# Patient Record
(demographics unavailable — no encounter records)

---

## 2024-10-08 NOTE — HISTORY OF PRESENT ILLNESS
[0 - No Distress] : Distress Level: 0 [80: Normal activity with effort; some signs or symptoms of disease.] : 80: Normal activity with effort; some signs or symptoms of disease.  [ECOG Performance Status: 2 - Ambulatory and capable of all self care but unable to carry out any work activities] : Performance Status: 2 - Ambulatory and capable of all self care but unable to carry out any work activities. Up and about more than 50% of waking hours [Disease:__________________________] : Disease: [unfilled] [FreeTextEntry1] : aspirin 81 [de-identified] : 10/08/2024: second visit [Date: ____________] : Patient's last distress assessment performed on [unfilled]. [de-identified] : Johnny Bland is 62-year-old male who is seen in follow up for anemia and deep venous thrombosis. Past medical history is remarkable for hypertension, diabetes, and hyperlipidemia,  history of three CVA first in August 2023; most recent May 2024 ([facility]) ; major neurological defect is balance; he had a cerebellum CVA.  At [facility] and increased bleeding stool hemoccult positive.  given transfusion on 09 June 2024 and he was transferred to [facility]  Seen in hospital 12 June 2024: history of bleeding from gastric ulcer (possible) anemia, diabetes hypertension, Anemia HGB 7.3 g/dL:

## 2024-10-08 NOTE — REASON FOR VISIT
[Follow-Up Visit] : a follow-up [Initial Consultation] : an initial consultation for [Family Member] : family member [Spouse] : spouse [Other: _____] : [unfilled]

## 2024-10-08 NOTE — PHYSICAL EXAM
[Capable of only limited self care, confined to bed or chair more than 50% of waking hours] : Status 3- Capable of only limited self care, confined to bed or chair more than 50% of waking hours [Normal] : affect appropriate [Ulcers] : no ulcers [Mucositis] : no mucositis [Thrush] : no thrush [Vesicles] : no vesicles [de-identified] : ataxia; normal F to nose. wide based gait [de-identified] : patient needs assistance with standing from wheel chair.

## 2024-10-08 NOTE — ASSESSMENT
[FreeTextEntry1] : Johnny Bland is a 62-year old male with hypertension and diabetes who has atherosclerosis, and this is the leading risk factor for CVA multiple in 2023 and 2024.  This is our second visit to discuss prior laboratory findings. His last hospitalization was at [facility] in June 2024. No evidence on testing of a hereditary or acquired predisposition to clotting tendency.  Duplex US Imaging of the right and left legs ordered for 27 August 2024 were reviewed with Bita and with Johnny. No active blood clot seen. The apixaban 2.5 mg PO BID may be discontinuing at the end of October.  Patient will remain on antihypertension medication renewal of the amlodipine, atorvastatin, and aspirin 81 mg PO daily.  iron and vitamin C until results of CBC are returned and will likely discontinue. He has a recurrent lipoma; for now, this lesion may be observed and followed up by Dr Camacho if resection is necessary.  RTC in 6 months to see Justa MENDOZA [Supportive] : Goals of care discussed with patient: Supportive [Palliative Care Plan] : not applicable at this time

## 2024-10-08 NOTE — RESULTS/DATA
[FreeTextEntry1] : review of data in EMHR review of HGB levels. negative anticardiolipin antibody, negative beta 2 screen, negative Silica clotting time, negative dilute Sb viper venom. 09/12/2024  bilateral lower extremities: no evidence of deep venous thrombosis in either lower extremity

## 2024-10-08 NOTE — REVIEW OF SYSTEMS
[Diarrhea: Grade 0] : Diarrhea: Grade 0 [Difficulty Walking] : difficulty walking [Negative] : Allergic/Immunologic [Abdominal Pain] : no abdominal pain [Vomiting] : no vomiting [Constipation] : no constipation [FreeTextEntry7] : stomach ulceration [de-identified] : balance difficulty

## 2025-01-16 NOTE — ASSESSMENT
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  ~2002 he had resection of a benign lipoma from this area.  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 Biopsy was being coordinated, but not done due to his multiple medical comorbidities, recurrent stroke, DVT, GI bleed, and protracted recuperation's.   Presently I recommended a repeat MRI. Prescription entered for right thigh MRI to be done at [place], or other facility if patient prefers.  We should speak when those results are available.  Will likely have (again) the recommendation for image guided biopsy to establish histology, to help guide further management.  Reviewed in detail, all questions answered.   1/16/2025: He and I spoke on the phone. January 13, 2025, he had a repeat MRI of the right thigh. 1.  The soft tissue mass of the medial right thigh measures 27.1 x 12 x 9.2 cm. 2.  It is multilobulated, and lipomatous. Located medially, is interspersed between the sartorius muscle fibers. 3.  Is deep to the hamstring musculature. 4.  It is anterior/medial to the adductor musculature. 5.  Is posterior to the vastus medialis. 6.  Intimately involved with the femoral neurovascular bundle. 7.  No discrete areas of nodular masslike enhancement. 8.  Not significantly changed from November 2023 MRI.  I recommended an image guided biopsy to establish histology before planning surgery. He understands and agrees. Request for procedure entered this evening.

## 2025-01-16 NOTE — REVIEW OF SYSTEMS
[Negative] : Heme/Lymph [FreeTextEntry3] : Glaucoma [FreeTextEntry5] : DVT [FreeTextEntry7] : Lisinopril [FreeTextEntry8] : Ulcer [de-identified] : Right thigh mass [de-identified] : History of stroke [de-identified] : Diabetes

## 2025-01-16 NOTE — HISTORY OF PRESENT ILLNESS
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 October 2023 imaging had suggested a lipomatous tumor, and plan needle biopsy was not performed due to multiple medical problems described below. He returns for interval reassessment.  The mass of the medial right thigh is asymptomatic.  Currently, no other specific or constitutional signs or symptoms.   Referred by his wife, who is a colleague: Dr. Lesley LÓPEZ (plastic surgery).  October 2023: He was seen with a ~3-year history of a slowly enlarging asymptomatic mass of the medial right thigh (recurrent).  ~2002: Resection of a medial right thigh lipoma by my partner JR.  Dr. López spoke with me September 2023. I suggested imaging, and the following results were obtained: 10/14/2023 studies from [place]: Right femur x-ray: No bony pathology. Right thigh: 14 x 5.8 x 11.3 cm subcutaneous mass with some focal nodularity posteriorly.  11/6/2023: Right thigh MRI at [facility]: 27 x 8.6 x 12.3 cm soft tissue mass of the right thigh (medial/posterior). Deep to, and parts INVOLVING the sartorius muscle.  Proximally, the posterior border is the adductor muscle group. Distally, it extends into the posterior thigh, what is bordered by the semimembranosus, semitendinosus, and gracilis muscles.  Lateral border is the short head of the biceps femoris muscle.  Multiple thin septations in the mass, some with focal nodularity.  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  I had recommended an image guided needle biopsy to establish histologic diagnosis. This was never done due to his multiple additional medical comorbidities (below).   No prior personal history of malignancy.   No family history of cancer.   PMD: Dr. Humberto ANN.  + ALLERGIC: Lisinopril.  + Anticoagulation: Daily aspirin.  Previously, when he had his DVT, he was treated with Lovenox, then Eliquis, and subsequently Brilinta, all of which have been discontinued.  No pacemaker or defibrillator. + LOOP-RECORDER.  August 2023: Presented with dizziness, nausea, and vomiting. Brain MRI: Left occipital and bilateral cerebellar acute and subacute infarcts. + Associated bilateral vertebral artery stenoses.  February 2024, he had a recurrent stroke with bilateral cerebellar infarcts. May-June 2024 he was hospitalized at [facility]. Neurology: Dr. Sivan BEASLEY, and Dr Marge HUTCHINSON.  June 2024. Developed a DVT. Treated with Lovenox. Became anemic, with hemoglobin dropping from 13 => 6.0.  Had EGD performed which demonstrated a gastric ulcer, that was treated endoscopically. GI: Dr. Dong CHAPMAN.  Capsule endoscopy: Rare/small nonbleeding angioectasias in the small bowel of unclear significance.  Anticoagulation changed from Lovenox to Eliquis, both have been since discontinued, along with the Brilinta.  Was transferred to [facility].  June 2024, he had a telehealth follow-up visit with Dr. Matt BURTON.  Hematology evaluation (Dr. Merritt COVARRUBIAS) identified no evidence of a hypercoagulable state.   His only current anticoagulant is daily aspirin.  + DIABETES. Treated with metformin. Endocrinology: Dr. Loren BILLINGS.  + Hypercholesterolemia. Takes atorvastatin.  + Hypertension. Treated with amlodipine and hydrochlorothiazide.  Cardiology: Dr. Will HECTOR.  Cardiovascular medicine: Dr. Ranjit HICKS.  + History of gastric ulcer (above). Takes daily pantoprazole. Also takes daily oral iron supplements.  November 2024: Ophthalmology visit: Dr. Sary RANDOLPH. Diagnosed with bilateral glaucoma, and nuclear sclerosis. These changes were felt to be mild, with no intervention recommended. 6-month follow-up scheduled for spring 2025. Also sees Dr. Clayton (ophthalmology).   Summer 2024: Colonoscopy at [facility] at the time of his GI bleed did not show anything worrisome.

## 2025-01-16 NOTE — REVIEW OF SYSTEMS
[Negative] : Heme/Lymph [FreeTextEntry3] : Glaucoma [FreeTextEntry5] : DVT [FreeTextEntry7] : Lisinopril [FreeTextEntry8] : Ulcer [de-identified] : Right thigh mass [de-identified] : History of stroke [de-identified] : Diabetes

## 2025-01-16 NOTE — REVIEW OF SYSTEMS
[Negative] : Heme/Lymph [FreeTextEntry3] : Glaucoma [FreeTextEntry5] : DVT [FreeTextEntry7] : Lisinopril [FreeTextEntry8] : Ulcer [de-identified] : Right thigh mass [de-identified] : History of stroke [de-identified] : Diabetes

## 2025-01-16 NOTE — PHYSICAL EXAM
[Normal] : supple, no neck mass and thyroid not enlarged [Normal] : no peripheral adenopathy appreciated [Normal Neck Lymph Nodes] : normal neck lymph nodes  [Normal Supraclavicular Lymph Nodes] : normal supraclavicular lymph nodes [Normal Groin Lymph Nodes] : normal groin lymph nodes [Normal Axillary Lymph Nodes] : normal axillary lymph nodes [de-identified] : See diagram [de-identified] : See diagram

## 2025-01-16 NOTE — PHYSICAL EXAM
[Normal] : supple, no neck mass and thyroid not enlarged [Normal] : no peripheral adenopathy appreciated [Normal Neck Lymph Nodes] : normal neck lymph nodes  [Normal Supraclavicular Lymph Nodes] : normal supraclavicular lymph nodes [Normal Axillary Lymph Nodes] : normal axillary lymph nodes [Normal Groin Lymph Nodes] : normal groin lymph nodes [de-identified] : See diagram [de-identified] : See diagram

## 2025-01-16 NOTE — HISTORY OF PRESENT ILLNESS
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 October 2023 imaging had suggested a lipomatous tumor, and plan needle biopsy was not performed due to multiple medical problems described below. He returns for interval reassessment.  The mass of the medial right thigh is asymptomatic.  Currently, no other specific or constitutional signs or symptoms.   Referred by his wife, who is a colleague: Dr. Lesley LÓPEZ (plastic surgery).  October 2023: He was seen with a ~3-year history of a slowly enlarging asymptomatic mass of the medial right thigh (recurrent).  ~2002: Resection of a medial right thigh lipoma by my partner JR.  Dr. López spoke with me September 2023. I suggested imaging, and the following results were obtained: 10/14/2023 studies from [place]: Right femur x-ray: No bony pathology. Right thigh: 14 x 5.8 x 11.3 cm subcutaneous mass with some focal nodularity posteriorly.  11/6/2023: Right thigh MRI at [place]: 27 x 8.6 x 12.3 cm soft tissue mass of the right thigh (medial/posterior). Deep to, and parts INVOLVING the sartorius muscle.  Proximally, the posterior border is the adductor muscle group. Distally, it extends into the posterior thigh, what is bordered by the semimembranosus, semitendinosus, and gracilis muscles.  Lateral border is the short head of the biceps femoris muscle.  Multiple thin septations in the mass, some with focal nodularity.  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  I had recommended an image guided needle biopsy to establish histologic diagnosis. This was never done due to his multiple additional medical comorbidities (below).   No prior personal history of malignancy.   No family history of cancer.   PMD: Dr. Humberto ANN.  + ALLERGIC: Lisinopril.  + Anticoagulation: Daily aspirin.  Previously, when he had his DVT, he was treated with Lovenox, then Eliquis, and subsequently Brilinta, all of which have been discontinued.  No pacemaker or defibrillator. + LOOP-RECORDER.  August 2023: Presented with dizziness, nausea, and vomiting. Brain MRI: Left occipital and bilateral cerebellar acute and subacute infarcts. + Associated bilateral vertebral artery stenoses.  February 2024, he had a recurrent stroke with bilateral cerebellar infarcts. May-June 2024 he was hospitalized at [facility]. Neurology: Dr. Sivan BEASLEY, and Dr Marge HUTCHINSON.  June 2024. Developed a DVT. Treated with Lovenox. Became anemic, with hemoglobin dropping from 13 => 6.0.  Had EGD performed which demonstrated a gastric ulcer, that was treated endoscopically. GI: Dr. Dong CHAPMAN.  Capsule endoscopy: Rare/small nonbleeding angioectasias in the small bowel of unclear significance.  Anticoagulation changed from Lovenox to Eliquis, both have been since discontinued, along with the Brilinta.  Was transferred to [facility].  June 2024, he had a telehealth follow-up visit with Dr. Matt BURTON.  Hematology evaluation (Dr. Merritt COVARRUBIAS) identified no evidence of a hypercoagulable state.   His only current anticoagulant is daily aspirin.  + DIABETES. Treated with metformin. Endocrinology: Dr. Loren BILLINGS.  + Hypercholesterolemia. Takes atorvastatin.  + Hypertension. Treated with amlodipine and hydrochlorothiazide.  Cardiology: Dr. Will HECTOR.  Cardiovascular medicine: Dr. Ranjit HICKS.  + History of gastric ulcer (above). Takes daily pantoprazole. Also takes daily oral iron supplements.  November 2024: Ophthalmology visit: Dr. Sary RANDOLPH. Diagnosed with bilateral glaucoma, and nuclear sclerosis. These changes were felt to be mild, with no intervention recommended. 6-month follow-up scheduled for spring 2025. Also sees Dr. Clayton (ophthalmology).   Summer 2024: Colonoscopy at [facility] at the time of his GI bleed did not show anything worrisome.

## 2025-01-16 NOTE — HISTORY OF PRESENT ILLNESS
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 October 2023 imaging had suggested a lipomatous tumor, and plan needle biopsy was not performed due to multiple medical problems described below. He returns for interval reassessment.  The mass of the medial right thigh is asymptomatic.  Currently, no other specific or constitutional signs or symptoms.   Referred by his wife, who is a colleague: Dr. Lesley LÓPEZ (plastic surgery).  October 2023: He was seen with a ~3-year history of a slowly enlarging asymptomatic mass of the medial right thigh (recurrent).  ~2002: Resection of a medial right thigh lipoma by my partner JR.  Dr. López spoke with me September 2023. I suggested imaging, and the following results were obtained: 10/14/2023 studies from [place]: Right femur x-ray: No bony pathology. Right thigh: 14 x 5.8 x 11.3 cm subcutaneous mass with some focal nodularity posteriorly.  11/6/2023: Right thigh MRI at [place]: 27 x 8.6 x 12.3 cm soft tissue mass of the right thigh (medial/posterior). Deep to, and parts INVOLVING the sartorius muscle.  Proximally, the posterior border is the adductor muscle group. Distally, it extends into the posterior thigh, what is bordered by the semimembranosus, semitendinosus, and gracilis muscles.  Lateral border is the short head of the biceps femoris muscle.  Multiple thin septations in the mass, some with focal nodularity.  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  I had recommended an image guided needle biopsy to establish histologic diagnosis. This was never done due to his multiple additional medical comorbidities (below).   No prior personal history of malignancy.   No family history of cancer.   PMD: Dr. Humberto ANN.  + ALLERGIC: Lisinopril.  + Anticoagulation: Daily aspirin.  Previously, when he had his DVT, he was treated with Lovenox, then Eliquis, and subsequently Brilinta, all of which have been discontinued.  No pacemaker or defibrillator. + LOOP-RECORDER.  August 2023: Presented with dizziness, nausea, and vomiting. Brain MRI: Left occipital and bilateral cerebellar acute and subacute infarcts. + Associated bilateral vertebral artery stenoses.  February 2024, he had a recurrent stroke with bilateral cerebellar infarcts. May-June 2024 he was hospitalized at [facility]. Neurology: Dr. Sivan BEASLEY, and Dr Marge HUTCHINSON.  June 2024. Developed a DVT. Treated with Lovenox. Became anemic, with hemoglobin dropping from 13 => 6.0.  Had EGD performed which demonstrated a gastric ulcer, that was treated endoscopically. GI: Dr. Dong CHAPMAN.  Capsule endoscopy: Rare/small nonbleeding angioectasias in the small bowel of unclear significance.  Anticoagulation changed from Lovenox to Eliquis, both have been since discontinued, along with the Brilinta.  Was transferred to [facility].  June 2024, he had a telehealth follow-up visit with Dr. Matt BURTON.  Hematology evaluation (Dr. Merritt COVARRUBIAS) identified no evidence of a hypercoagulable state.   His only current anticoagulant is daily aspirin.  + DIABETES. Treated with metformin. Endocrinology: Dr. Loren BILLINGS.  + Hypercholesterolemia. Takes atorvastatin.  + Hypertension. Treated with amlodipine and hydrochlorothiazide.  Cardiology: Dr. Will HECTOR.  Cardiovascular medicine: Dr. Ranjit HICKS.  + History of gastric ulcer (above). Takes daily pantoprazole. Also takes daily oral iron supplements.  November 2024: Ophthalmology visit: Dr. Sary RANDOLPH. Diagnosed with bilateral glaucoma, and nuclear sclerosis. These changes were felt to be mild, with no intervention recommended. 6-month follow-up scheduled for spring 2025. Also sees Dr. Clayton (ophthalmology).   Summer 2024: Colonoscopy at [facility] at the time of his GI bleed did not show anything worrisome.

## 2025-01-16 NOTE — ASSESSMENT
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  ~2002 he had resection of a benign lipoma from this area.  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 Biopsy was being coordinated, but not done due to his multiple medical comorbidities, recurrent stroke, DVT, GI bleed, and protracted recuperation's.   Presently I recommended a repeat MRI. Prescription entered for right thigh MRI to be done at [place], or other facility if patient prefers.  We should speak when those results are available.  Will likely have (again) the recommendation for image guided biopsy to establish histology, to help guide further management.  Reviewed in detail, all questions answered.   1/16/2025: He and I spoke on the phone. January 13, 2025, he had a repeat MRI of the right thigh. 1.  The soft tissue mass of the medial right thigh measures 27.1 x 12 x 9.2 cm. 2.  It is multilobulated, and lipomatous. Located medially, is interspersed between the sartorius muscle fibers. 3.  Is deep to the hamstring musculature. 4.  It is anterior/medial to the adductor musculature. 5.  Is posterior to the vastus medialis. 6.  Intimately involved with the femoral neurovascular bundle. 7.  No discrete areas of nodular masslike enhancement. 8.  Not significantly changed from November 2023 MRI.  I recommended an image guided biopsy to establish histology before planning surgery. He understands and agrees. Request for procedure entered this evening.

## 2025-01-16 NOTE — ASSESSMENT
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  ~2002 he had resection of a benign lipoma from this area.  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 Biopsy was being coordinated, but not done due to his multiple medical comorbidities, recurrent stroke, DVT, GI bleed, and protracted recuperation's.   Presently I recommended a repeat MRI. Prescription entered for right thigh MRI to be done at [place], or other facility if patient prefers.  We should speak when those results are available.  Will likely have (again) the recommendation for image guided biopsy to establish histology, to help guide further management.  Reviewed in detail, all questions answered.   1/16/2025: He and I spoke on the phone. January 13, 2025, he had a repeat MRI of the right thigh. 1.  The soft tissue mass of the medial right thigh measures 27.1 x 12 x 9.2 cm. 2.  It is multilobulated, and lipomatous. Located medially, is interspersed between the sartorius muscle fibers. 3.  Is deep to the hamstring musculature. 4.  It is anterior/medial to the adductor musculature. 5.  Is posterior to the vastus medialis. 6.  Intimately involved with the femoral neurovascular bundle. 7.  No discrete areas of nodular masslike enhancement. 8.  Not significantly changed from November 2023 MRI.  I recommended an image guided biopsy to establish histology before planning surgery. He understands and agrees. Request for procedure entered this evening.

## 2025-01-16 NOTE — PHYSICAL EXAM
[Normal] : supple, no neck mass and thyroid not enlarged [Normal] : no peripheral adenopathy appreciated [Normal Neck Lymph Nodes] : normal neck lymph nodes  [Normal Supraclavicular Lymph Nodes] : normal supraclavicular lymph nodes [Normal Groin Lymph Nodes] : normal groin lymph nodes [Normal Axillary Lymph Nodes] : normal axillary lymph nodes [de-identified] : See diagram [de-identified] : See diagram

## 2025-01-16 NOTE — PHYSICAL EXAM
[Normal] : supple, no neck mass and thyroid not enlarged [Normal] : no peripheral adenopathy appreciated [Normal Neck Lymph Nodes] : normal neck lymph nodes  [Normal Supraclavicular Lymph Nodes] : normal supraclavicular lymph nodes [Normal Axillary Lymph Nodes] : normal axillary lymph nodes [Normal Groin Lymph Nodes] : normal groin lymph nodes [de-identified] : See diagram [de-identified] : See diagram

## 2025-01-16 NOTE — REVIEW OF SYSTEMS
[Negative] : Heme/Lymph [FreeTextEntry3] : Glaucoma [FreeTextEntry5] : DVT [FreeTextEntry7] : Lisinopril [FreeTextEntry8] : Ulcer [de-identified] : Right thigh mass [de-identified] : History of stroke [de-identified] : Diabetes

## 2025-01-16 NOTE — ASSESSMENT
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  ~2002 he had resection of a benign lipoma from this area.  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 Biopsy was being coordinated, but not done due to his multiple medical comorbidities, recurrent stroke, DVT, GI bleed, and protracted recuperation's.   Presently I recommended a repeat MRI. Prescription entered for right thigh MRI to be done at [place], or other facility if patient prefers.  We should speak when those results are available.  Will likely have (again) the recommendation for image guided biopsy to establish histology, to help guide further management.  Reviewed in detail, all questions answered.   1/16/2025: He and I spoke on the phone. January 13, 2025, he had a repeat MRI of the right thigh. 1.  The soft tissue mass of the medial right thigh measures 27.1 x 12 x 9.2 cm. 2.  It is multilobulated, and lipomatous. Located medially, is interspersed between the sartorius muscle fibers. 3.  Is deep to the hamstring musculature. 4.  It is anterior/medial to the adductor musculature. 5.  Is posterior to the vastus medialis. 6.  Intimately involved with the femoral neurovascular bundle. 7.  No discrete areas of nodular masslike enhancement. 8.  Not significantly changed from November 2023 MRI.  I recommended an image guided biopsy to establish histology before planning surgery. He understands and agrees. Request for procedure entered this evening.

## 2025-01-16 NOTE — REASON FOR VISIT
[Follow-Up Visit] : a follow-up visit for [Other: _____] : [unfilled] [FreeTextEntry2] : Recurrent, soft tissue mass of the medial right thigh.

## 2025-01-16 NOTE — HISTORY OF PRESENT ILLNESS
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 October 2023 imaging had suggested a lipomatous tumor, and plan needle biopsy was not performed due to multiple medical problems described below. He returns for interval reassessment.  The mass of the medial right thigh is asymptomatic.  Currently, no other specific or constitutional signs or symptoms.   Referred by his wife, who is a colleague: Dr. Lesley LÓPEZ (plastic surgery).  October 2023: He was seen with a ~3-year history of a slowly enlarging asymptomatic mass of the medial right thigh (recurrent).  ~2002: Resection of a medial right thigh lipoma by my partner JR.  Dr. López spoke with me September 2023. I suggested imaging, and the following results were obtained: 10/14/2023 studies from [facility]: Right femur x-ray: No bony pathology. Right thigh: 14 x 5.8 x 11.3 cm subcutaneous mass with some focal nodularity posteriorly.  11/6/2023: Right thigh MRI at [facility]: 27 x 8.6 x 12.3 cm soft tissue mass of the right thigh (medial/posterior). Deep to, and parts INVOLVING the sartorius muscle.  Proximally, the posterior border is the adductor muscle group. Distally, it extends into the posterior thigh, what is bordered by the semimembranosus, semitendinosus, and gracilis muscles.  Lateral border is the short head of the biceps femoris muscle.  Multiple thin septations in the mass, some with focal nodularity.  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  I had recommended an image guided needle biopsy to establish histologic diagnosis. This was never done due to his multiple additional medical comorbidities (below).   No prior personal history of malignancy.   No family history of cancer.   PMD: Dr. Humberto ANN.  + ALLERGIC: Lisinopril.  + Anticoagulation: Daily aspirin.  Previously, when he had his DVT, he was treated with Lovenox, then Eliquis, and subsequently Brilinta, all of which have been discontinued.  No pacemaker or defibrillator. + LOOP-RECORDER.  August 2023: Presented with dizziness, nausea, and vomiting. Brain MRI: Left occipital and bilateral cerebellar acute and subacute infarcts. + Associated bilateral vertebral artery stenoses.  February 2024, he had a recurrent stroke with bilateral cerebellar infarcts. May-June 2024 he was hospitalized at [facility]. Neurology: Dr. Sivan BEASLEY, and Dr Marge HUTCHINSON.  June 2024. Developed a DVT. Treated with Lovenox. Became anemic, with hemoglobin dropping from 13 => 6.0.  Had EGD performed which demonstrated a gastric ulcer, that was treated endoscopically. GI: Dr. Dong CHAPMAN.  Capsule endoscopy: Rare/small nonbleeding angioectasias in the small bowel of unclear significance.  Anticoagulation changed from Lovenox to Eliquis, both have been since discontinued, along with the Brilinta.  Was transferred to [facility].  June 2024, he had a telehealth follow-up visit with Dr. Matt BURTON.  Hematology evaluation (Dr. Merritt COVARRUBIAS) identified no evidence of a hypercoagulable state.   His only current anticoagulant is daily aspirin.  + DIABETES. Treated with metformin. Endocrinology: Dr. Loren BILLINGS.  + Hypercholesterolemia. Takes atorvastatin.  + Hypertension. Treated with amlodipine and hydrochlorothiazide.  Cardiology: Dr. Will HECTOR.  Cardiovascular medicine: Dr. Ranjit HICKS.  + History of gastric ulcer (above). Takes daily pantoprazole. Also takes daily oral iron supplements.  November 2024: Ophthalmology visit: Dr. Sary RANDOLPH. Diagnosed with bilateral glaucoma, and nuclear sclerosis. These changes were felt to be mild, with no intervention recommended. 6-month follow-up scheduled for spring 2025. Also sees Dr. Clayton (ophthalmology).   Summer 2024: Colonoscopy at [facility] at the time of his GI bleed did not show anything worrisome.

## 2025-01-30 NOTE — ASSESSMENT
[FreeTextEntry1] : 60 y/o M PMH CVA w/ RLE weakness, HTN, PAD, DM2, GIB 2/2 gastric ulcer s/p EGD clamping 5/2024, RLE DVT, CKD stage 3, recently hospitalized at [facility] in May to June 2024 for CVA, GIB) discharged to [facility], readmitted due to anemia, s/p PRBC transfusion - noted to have melena and positive guaiac. Patient presents to vascular clinic due to a  right posterior tibial vein DVT(now resoved) and a stable right peroneal vein DVT as well as a persistent DVT within the left gastrocnemius vein.  Plan: 1.Venous duplex with chronic below the knee  2. Patient with improved mobility  3.  Eliquis was stopped in October.  4.  Chronic changes R peroneal vein.  I think with his limited mobility and surgery coming up, he should RESUME eliquis 2.5mg BID  5.  I think if he goes for for lipoma resection, given his history of DVT, if his mobility is going to be limited post op, it may be reasonable to STAY ON eliquis 2.5mg BID Would repeat duplex 2 weeks post op  Hold eliquis 3 days prior to Biopsy and resume afterwards last dose Thursday Evening Feb 6th Hold eliquis 3 days prior to lipoma resection and resume afterwards  Adbry Counseling: I discussed with the patient the risks of tralokinumab including but not limited to eye infection and irritation, cold sores, injection site reactions, worsening of asthma, allergic reactions and increased risk of parasitic infection.  Live vaccines should be avoided while taking tralokinumab. The patient understands that monitoring is required and they must alert us or the primary physician if symptoms of infection or other concerning signs are noted.

## 2025-01-30 NOTE — PHYSICAL EXAM
[Normal Appearance] : normal appearance [Respiration, Rhythm And Depth] : normal respiratory rhythm and effort [Heart Sounds] : normal S1 and S2 [Abdomen Soft] : soft [Abdomen Tenderness] : non-tender [Nail Clubbing] : no clubbing of the fingernails [Cyanosis, Localized] : no localized cyanosis [Petechial Hemorrhages (___cm)] : no petechial hemorrhages [] : no ischemic changes [Oriented To Time, Place, And Person] : oriented to person, place, and time [Skin Color & Pigmentation] : normal skin color and pigmentation [FreeTextEntry1] : Has right sided weakness

## 2025-01-30 NOTE — PHYSICAL EXAM
[Normal Appearance] : normal appearance [Respiration, Rhythm And Depth] : normal respiratory rhythm and effort [Heart Sounds] : normal S1 and S2 [Abdomen Soft] : soft [Abdomen Tenderness] : non-tender [Nail Clubbing] : no clubbing of the fingernails [Cyanosis, Localized] : no localized cyanosis [Petechial Hemorrhages (___cm)] : no petechial hemorrhages [] : no ischemic changes [Skin Color & Pigmentation] : normal skin color and pigmentation [Oriented To Time, Place, And Person] : oriented to person, place, and time [FreeTextEntry1] : Has right sided weakness

## 2025-01-30 NOTE — REASON FOR VISIT
[Follow-Up - Clinic] : a clinic follow-up of [FreeTextEntry2] : VTE [FreeTextEntry1] : 1/30/2025  Seen by Dr. Jimenez Oct 2024 Eliquis 2.5m stopped End of Oct Duplex earlier this month with CHRONIC changes in the R BTK Vein  needs biopsy of R  leg lipoma May need surgery for the leg.   LE venous duplex 9/12: No DVT  9/13/2024  Doing well now on Iron BID on eliquis 5mg BID remains on aspirin 81mg daily   LE venous duplex 9/12/2024: No evidence of deep venous thrombosis in either lower extremity.  7/24/2024  Recent hospitalization 6/9 to 6/21/2024  62 y/o M PMH CVA w/ RLE weakness, HTN, PAD, DM2, GIB 2/2 gastric ulcer s/p EGD clamping 5/2024, RLE DVT, CKD stage 3, recently hospitalized at [facility] in May to June 2024 for CVA, GIB) discharged to [facility], readmitted due to anemia, s/p PRBC transfusion - noted to have melena and positive guaiac.  LE venous duplex 7/2/2024: Interval resolution of previously demonstrated right posterior tibial vein DVT. There is stable right peroneal vein DVT. Persistent DVT within the left gastrocnemius vein.  s/p capsule endoscopy; some rare/small/non bleeding angioectasias in the small bowel. Unclear significance. IR was consulted and IVC filter was not recommended.  Patient was discharged on Eliquis 5 mg BID.  Stroke work-up: Negative bubble study Loop recorder implanted, no events so far Hypercoagulable workup was sent: negative so far  Patient does not have a family history of hypercoagulable state, DVT/PE.      Discharge Medications (updated in allscripts): 	acetaminophen 325 mg oral tablet: 2 tab(s) orally every 6 hours as needed amLODIPine 5 mg oral tablet: 1 tab(s) orally once a day apixaban 5 mg oral tablet: 1 tab(s) orally every 12 hours aspirin 81 mg oral delayed release tablet: 1 tab(s) orally once a day atorvastatin 40 mg oral tablet: 1 tab(s) orally once a day (at bedtime) bisacodyl 10 mg rectal suppository: 1 suppository(ies) rectal once a day As needed Constipation hydroCHLOROthiazide 12.5 mg oral capsule: 1 cap(s) orally once a day insulin lispro 100 units/mL subcutaneous solution: subcutaneous 3 times a day (before meals) as per sliding scale. 151-200= 1u, 201-250= 2u, 241-300= 3u, 301-400= 4u MetFORMIN (Eqv-Glucophage XR) 500 mg oral tablet, extended release: 1 tab(s) orally once a day (in the evening) pantoprazole 40 mg oral delayed release tablet: 1 tab(s) orally 2 times a day polyethylene glycol 3350 oral powder for reconstitution: 17 gram(s) orally once a day senna leaf extract oral tablet: 2 tab(s) orally once a day (at bedtime) Vitamin A and D topical ointment: Apply topically to affected area 3 times a day ,

## 2025-01-30 NOTE — REASON FOR VISIT
[Follow-Up - Clinic] : a clinic follow-up of [FreeTextEntry2] : VTE [FreeTextEntry1] : 1/30/2025  Seen by Dr. Jimenez Oct 2024 Eliquis 2.5m stopped End of Oct Duplex earlier this month with CHRONIC changes in the R BTK Vein  needs biopsy of R  leg lipoma May need surgery for the leg.   LE venous duplex 9/12: No DVT  9/13/2024  Doing well now on Iron BID on eliquis 5mg BID remains on aspirin 81mg daily   LE venous duplex 9/12/2024: No evidence of deep venous thrombosis in either lower extremity.  7/24/2024  Recent hospitalization 6/9 to 6/21/2024  60 y/o M PMH CVA w/ RLE weakness, HTN, PAD, DM2, GIB 2/2 gastric ulcer s/p EGD clamping 5/2024, RLE DVT, CKD stage 3, recently hospitalized at [facility] in May to June 2024 for CVA, GIB) discharged to [facility], readmitted due to anemia, s/p PRBC transfusion - noted to have melena and positive guaiac.  LE venous duplex 7/2/2024: Interval resolution of previously demonstrated right posterior tibial vein DVT. There is stable right peroneal vein DVT. Persistent DVT within the left gastrocnemius vein.  s/p capsule endoscopy; some rare/small/non bleeding angioectasias in the small bowel. Unclear significance. IR was consulted and IVC filter was not recommended.  Patient was discharged on Eliquis 5 mg BID.  Stroke work-up: Negative bubble study Loop recorder implanted, no events so far Hypercoagulable workup was sent: negative so far  Patient does not have a family history of hypercoagulable state, DVT/PE.      Discharge Medications (updated in allscripts): 	acetaminophen 325 mg oral tablet: 2 tab(s) orally every 6 hours as needed amLODIPine 5 mg oral tablet: 1 tab(s) orally once a day apixaban 5 mg oral tablet: 1 tab(s) orally every 12 hours aspirin 81 mg oral delayed release tablet: 1 tab(s) orally once a day atorvastatin 40 mg oral tablet: 1 tab(s) orally once a day (at bedtime) bisacodyl 10 mg rectal suppository: 1 suppository(ies) rectal once a day As needed Constipation hydroCHLOROthiazide 12.5 mg oral capsule: 1 cap(s) orally once a day insulin lispro 100 units/mL subcutaneous solution: subcutaneous 3 times a day (before meals) as per sliding scale. 151-200= 1u, 201-250= 2u, 241-300= 3u, 301-400= 4u MetFORMIN (Eqv-Glucophage XR) 500 mg oral tablet, extended release: 1 tab(s) orally once a day (in the evening) pantoprazole 40 mg oral delayed release tablet: 1 tab(s) orally 2 times a day polyethylene glycol 3350 oral powder for reconstitution: 17 gram(s) orally once a day senna leaf extract oral tablet: 2 tab(s) orally once a day (at bedtime) Vitamin A and D topical ointment: Apply topically to affected area 3 times a day ,

## 2025-01-30 NOTE — REASON FOR VISIT
[Follow-Up - Clinic] : a clinic follow-up of [FreeTextEntry2] : VTE [FreeTextEntry1] : 1/30/2025  Seen by Dr. Jimenez Oct 2024 Eliquis 2.5m stopped End of Oct Duplex earlier this month with CHRONIC changes in the R BTK Vein  needs biopsy of R  leg lipoma May need surgery for the leg.   LE venous duplex 9/12: No DVT  9/13/2024  Doing well now on Iron BID on eliquis 5mg BID remains on aspirin 81mg daily   LE venous duplex 9/12/2024: No evidence of deep venous thrombosis in either lower extremity.  7/24/2024  Recent hospitalization 6/9 to 6/21/2024  60 y/o M PMH CVA w/ RLE weakness, HTN, PAD, DM2, GIB 2/2 gastric ulcer s/p EGD clamping 5/2024, RLE DVT, CKD stage 3, recently hospitalized at [facility] in May to June 2024 for CVA, GIB) discharged to [facility], readmitted due to anemia, s/p PRBC transfusion - noted to have melena and positive guaiac.  LE venous duplex 7/2/2024: Interval resolution of previously demonstrated right posterior tibial vein DVT. There is stable right peroneal vein DVT. Persistent DVT within the left gastrocnemius vein.  s/p capsule endoscopy; some rare/small/non bleeding angioectasias in the small bowel. Unclear significance. IR was consulted and IVC filter was not recommended.  Patient was discharged on Eliquis 5 mg BID.  Stroke work-up: Negative bubble study Loop recorder implanted, no events so far Hypercoagulable workup was sent: negative so far  Patient does not have a family history of hypercoagulable state, DVT/PE.      Discharge Medications (updated in allscripts): 	acetaminophen 325 mg oral tablet: 2 tab(s) orally every 6 hours as needed amLODIPine 5 mg oral tablet: 1 tab(s) orally once a day apixaban 5 mg oral tablet: 1 tab(s) orally every 12 hours aspirin 81 mg oral delayed release tablet: 1 tab(s) orally once a day atorvastatin 40 mg oral tablet: 1 tab(s) orally once a day (at bedtime) bisacodyl 10 mg rectal suppository: 1 suppository(ies) rectal once a day As needed Constipation hydroCHLOROthiazide 12.5 mg oral capsule: 1 cap(s) orally once a day insulin lispro 100 units/mL subcutaneous solution: subcutaneous 3 times a day (before meals) as per sliding scale. 151-200= 1u, 201-250= 2u, 241-300= 3u, 301-400= 4u MetFORMIN (Eqv-Glucophage XR) 500 mg oral tablet, extended release: 1 tab(s) orally once a day (in the evening) pantoprazole 40 mg oral delayed release tablet: 1 tab(s) orally 2 times a day polyethylene glycol 3350 oral powder for reconstitution: 17 gram(s) orally once a day senna leaf extract oral tablet: 2 tab(s) orally once a day (at bedtime) Vitamin A and D topical ointment: Apply topically to affected area 3 times a day ,

## 2025-01-30 NOTE — ASSESSMENT
[FreeTextEntry1] : 62 y/o M PMH CVA w/ RLE weakness, HTN, PAD, DM2, GIB 2/2 gastric ulcer s/p EGD clamping 5/2024, RLE DVT, CKD stage 3, recently hospitalized at [facility] in May to June 2024 for CVA, GIB) discharged to [facility], readmitted due to anemia, s/p PRBC transfusion - noted to have melena and positive guaiac. Patient presents to vascular clinic due to a  right posterior tibial vein DVT(now resoved) and a stable right peroneal vein DVT as well as a persistent DVT within the left gastrocnemius vein.  Plan: 1.Venous duplex with chronic below the knee  2. Patient with improved mobility  3.  Eliquis was stopped in October.  4.  Chronic changes R peroneal vein.  I think with his limited mobility and surgery coming up, he should RESUME eliquis 2.5mg BID  5.  I think if he goes for for lipoma resection, given his history of DVT, if his mobility is going to be limited post op, it may be reasonable to STAY ON eliquis 2.5mg BID Would repeat duplex 2 weeks post op  Hold eliquis 3 days prior to Biopsy and resume afterwards last dose Thursday Evening Feb 6th Hold eliquis 3 days prior to lipoma resection and resume afterwards

## 2025-01-30 NOTE — ASSESSMENT
[FreeTextEntry1] : 60 y/o M PMH CVA w/ RLE weakness, HTN, PAD, DM2, GIB 2/2 gastric ulcer s/p EGD clamping 5/2024, RLE DVT, CKD stage 3, recently hospitalized at [facility] in May to June 2024 for CVA, GIB) discharged to [facility], readmitted due to anemia, s/p PRBC transfusion - noted to have melena and positive guaiac. Patient presents to vascular clinic due to a  right posterior tibial vein DVT(now resoved) and a stable right peroneal vein DVT as well as a persistent DVT within the left gastrocnemius vein.  Plan: 1.Venous duplex with chronic below the knee  2. Patient with improved mobility  3.  Eliquis was stopped in October.  4.  Chronic changes R peroneal vein.  I think with his limited mobility and surgery coming up, he should RESUME eliquis 2.5mg BID  5.  I think if he goes for for lipoma resection, given his history of DVT, if his mobility is going to be limited post op, it may be reasonable to STAY ON eliquis 2.5mg BID Would repeat duplex 2 weeks post op  Hold eliquis 3 days prior to Biopsy and resume afterwards last dose Thursday Evening Feb 6th Hold eliquis 3 days prior to lipoma resection and resume afterwards  [FreeTextEntry1] : On birth control and does not remember when last cycle was.

## 2025-03-24 NOTE — DISCUSSION/SUMMARY
[FreeTextEntry1] : Mr. 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  He was treated at [facility] for dizziness nausea/vomiting-found to have bilateral cerebellar infarctions and left occipital infarction. The ischemic infarcts were thought to be secondary to basilar artery stenosis which was being monitored over MRA head no, progressive worsening was noted from 35-18. Following this cerebral catheter angiogram was done that showed complete occlusion of basilar artery. He has since recovered significantly but continues to have issues with balance and have recommended that he continue physical therapy along with gym exercises to improve balance.  I also recommended that at this point when we have concrete evidence of basilar artery occlusion it is reasonable to continue antiplatelet/antithrombotic therapy. There is no strong indication for dual antiplatelet therapy however knowing that he had progressive basilar stenosis it is reasonable to continue aspirin 81 mg and ticagrelor 90 mg twice daily along with high-dose statins. I also recommended aggressive medical management of risk factors. And a follow-up MRI brain in 6 months to rule out silent brain infarction due to existing large artery atherosclerotic disease.  4/19/24 - Overall he is neurologically stable. - In February 2024, he had a recurrent stroke with b/l cerebellar infarcts, likely due to symptomatic intracranial atherosclerosis (basilar stenosis versus occlusion). At the time of presentation he was on Aspirin and Clopidogrel; his P2Y12 was subtherapeutic, though he did admit to missing a few doses. He was switched to Brilinta 90mg and Aspirin.  6/25/2024- Phone appointment - Hospital admission - Now on Lovenox for DVT - Hb dropped from 13 to 6.0; transfused, now in [facility] No new infarcts - on MRI in May 2024; Hb is stabilized on 8.5; now on apixaban. Full dose DVT below knee - new - no filter necessary  I had a very lengthy discussion with the patient's wife and the patient and decided to continue aspirin 81 mg in addition to full dose apixaban. They will make an appointment to see us after discharge from [facility] rehab following which we will decide whether he needs antiplatelet and anticoagulation continuously. Based on my review of his case severe vertebrobasilar stenosis, otherwise lower risk of recurrent GI bleed it may be reasonable to maintain him on either dual antiplatelet or full dose anticoagulation for long-term.   3/24/2025 Since last visit he reports feeling well. Denies any new or worsening symptoms. He is currently on AC and ASA as per hematology/vascular for DVT. Once AC is discontinued by them, he will remain on ASA along with strict risk factor control. Signs of stroke discussed in detail. All questions and concerns have been addressed. I will call them after Dopplers.  [Antithrombotic therapy with ___] : antithrombotic therapy with  [unfilled] [Intensive Blood Pressure Control] : intensive blood pressure control [Lipid Lowering Therapy] : lipid lowering therapy [Patient encouraged to discuss with Primary MD] : I encouraged the patient to discuss these important issues with ~his/her~ primary care doctor [Goals and Counseling] : I have reviewed the goals of stroke risk factor modification. I counseled the patient on measures to reduce stroke risk, including the importance of medication compliance, risk factor control, exercise, healthy diet and avoidance of smoking. I reviewed stroke warning signs and symptoms and appropriate actions to take if such occur.

## 2025-03-24 NOTE — HISTORY OF PRESENT ILLNESS
[FreeTextEntry1] : CARLOS FARR is a 61 year-old male with a PMHx significant for HTN, DM II (non-compliant with Metformin), and HLD, who presents to Dr. Fuentes office today for evaluation of Basilar artery stenosis.  Still complains of balance issues - later pulsion - either side nonspecific. No falls  History - HPI modified from  visit - Patient initially presented to [facility] ED 8/25/23 complaining of dizziness, nausea/vomiting, and generalized weakness. He reported his symptoms started several days prior that resolved, but returned day of presentation. NIHSS 2. CT Head demonstrated small to moderate subacute Left superior cerebellar infarct and small subacute Left occipital infarct with suspicion for acute Right superior cerebellar infarct as well. CTA Head/Neck was non-diagnostic as there was poor opacification of contrast, but did suggested L superior cerebellar infarct, L occipital infarct, and R cerebellar infarct. Of note, patient's BG in ED was 300s, patient had stopped taking his Metformin. Patient's exam declined and had increased lethargy while in the ED. Patient was outside the window for TNK so was transferred to [facility] Neuro ICU for SOC watch. MRA NOVA demonstrated Vertebrobasilar artery stenosis. Stroke etiology determined ICAD, so patient discharged 9/01/23 with outpatient Neuro follow up. Neurology repeated MRA NOVA which showed even further decreased flow in basilar artery and referred to Neurosurgery for possible angiogram.  Recent labs (12/23): LDL 58 and A1c 6.1%.  TODAY 01/02/2024 Patient reports some intermittent dizziness in the mornings. He is tolerating his Aspirin 162 mg, Brilinta 90 mg BID and Atorvastatin 80 mg without bleeding, bruising or myalgias. No A fib detected on his ILR since implantation. Patient denies headache, cervicalgia, nausea/vomiting, visual disturbance, numbness/tingling, extremity weakness, or seizure-like activity.  4/19/24 He presents to the office today. Unfortunately, he had a recurrent stroke in February 2024. He notes that he has had mild shortness of breath over the last few weeks.  PCP: Dr. Humberto Eason [address], [address], [place], [place] [id number] [phone]  Neuro: Marge Watson NP    3/24/2025 In 6/2024 he was admitted to [facility] with GI bleed and DVTs. He was cleared by GI to start Eliquis as recommended by hematology for DVT. Hematology discontinued AC in 10/2024 but it was restarted by vascular surgery. He is having a lipoma removed from his leg with Dr. Camacho in upcoming weeks. Family notes plan is to continue AC until then and then potential stop it if no longer warranted. He will continue ASA. He was late for scheduled TCD and carotids so they were rescheduled.

## 2025-03-24 NOTE — PHYSICAL EXAM
[FreeTextEntry1] : Constitutional: alert, in no acute distress, well nourished and well developed.  Psychiatric: oriented to person, place, and time, the affect was normal and the mood was normal.  Neurologic:   Orientation: oriented to person, oriented to place and oriented to time.   Attention: normal concentrating ability and visual attention was not decreased.   Language: no difficulty naming common objects, no difficulty repeating a phrase, no difficulty writing a sentence, fluency intact, comprehension intact and reading intact.   Fund of knowledge: displays adequate knowledge of personal past history.   Cranial Nerves: extraocular motion intact, facial sensation intact symmetrically, face symmetrical, hearing was intact bilaterally, tongue and palate midline, head turning and shoulder shrug symmetric and there was no tongue deviation with protrusion . right facial UMN, left eye ptosis.   Motor: muscle tone was normal in all four extremities, muscle strength was normal in all four extremities and normal bulk in all four extremities.   Sensory exam: light touch was intact.   Coordination:. the patient's balance was impaired. there was no past-pointing. no tremor present.   Deep tendon reflexes: Biceps right 2+. Biceps left 2+.  Triceps right 2+. Triceps left 2+.  Brachioradialis right 2+. Brachioradialis left 2+.  Patella right 2+. Patella left 2+.  Ankle jerk right 2+. Ankle jerk left 2+.   Plantar responses normal on the right, normal on the left.    Eyes: the sclera and conjunctiva were normal, extraocular movements were intact and full visual field.  Pulmonary: no respiratory distress.  Skin: normal skin color and pigmentation.

## 2025-03-27 NOTE — ASSESSMENT
[FreeTextEntry1] : Blood work was drawn and sent to the lab today. The patient has been instructed to call the office next week to discuss today's lab work.  continue all meds  f/u with Cardiology / Neurology  will proceed with lipoma resection in coming weeks may need MC  pt advised to take metformin daily  continue Eliquis per Dr Ceja  f/u 3 months

## 2025-03-27 NOTE — HISTORY OF PRESENT ILLNESS
[de-identified] : Pt presents for f/u evaluation of HTN, hyperlipidemia, DM, and CVA  61 y/o M PMH CVA w/ RLE weakness, HTN, PAD, DM2, GIB 2/2 gastric ulcer s/p EGD clamping 5/2024, RLE DVT, CKD stage 3, recently hospitalized at [facility] in May to June 2024 for CVA, GIB) discharged to [facility], readmitted due to anemia, s/p PRBC transfusion - noted to have melena and positive guaiac.  LE venous duplex 7/2/2024: Interval resolution of previously demonstrated right posterior tibial vein DVT. There is stable right peroneal vein DVT. Persistent DVT within the left gastrocnemius vein.  s/p capsule endoscopy; some rare/small/non bleeding angioectasias in the small bowel. Unclear significance. IR was consulted and IVC filter was not recommended. Patient was discharged on Eliquis 5 mg BID.  Stroke work-up: Negative bubble study Loop recorder implanted, no events so far Hypercoagulable workup was sent: negative so far  Patient does not have a family history of hypercoagulable state, DVT/PE.  now on iron as well as eliquis 5 mg bid/ ASA 81 mg daily  3/27/25  on Eliquis/ASA until lipoma resection

## 2025-03-27 NOTE — PHYSICAL EXAM
[Normal] : affect was normal and insight and judgment were intact [de-identified] : trace  b/l ankle  edema [de-identified] : unsteady gait

## 2025-04-01 NOTE — HISTORY OF PRESENT ILLNESS
[FreeTextEntry1] : Problems: 1. DM type 2 2. Hypertension 3. Hyperlipidemia 4. DM nephropathy (Cr fluctuates between elevated and carmina, neg urine microalbumin panel in Dec 2023)  NOTE - PATIENT NEEDS CLEARANCE FOR SURGERY FOR LIPOMA REMOVAL   DM type 2 1. Diagnosed in 2021 2. Meds: Metformin  mg po once weekly  (has tolerable burping and halitosis with this)  - I DISCONTINUED THIS ON 04/01/2025 3. CGM DEXCOM 7 - patient is connected to the clinic - 50s to 200s (corresponding fingerstick when BG was 50 was 140), no hypoglycemic unawareness 4. On Aspirin 81 mg po daily, on atorvastatin 40 mg po daily, not ACEi/ARB - patient had angioedema with lisinopril in the past 5. Complications: Has DM nephropathy (Cr fluctuates between elevated and carmina, neg urine microalbumin panel in Dec 2023) No DM retinopathy (last eye exam was in March 2025, patient advised on the need for annual DM eye exam) Had multiple strokes in the past, No other ASCVD No foot ulcers/amputation 6. Patient never smoked cigarettes

## 2025-04-01 NOTE — PROCEDURE
[FreeTextEntry1] : CGM interpretation: Device: DEXCOM  Period: March 19th 2025 to April 1st 2025 Findings:  Percent in range: 92 %, Percent low/very low BGs: 0 %, Percent high/very high BGs: 8 % Interpretation - BGs are at goal

## 2025-04-01 NOTE — ASSESSMENT
[FreeTextEntry1] : Target: HbA1c < 7%, BP < 130/80, LDL < 55  HbA1c is at goal but patient has not been compliant with metformin (he uses this only once per week) so I discontinued metformin.  BP is at goal. LDL mildly above goal but I will accept this  PATIENT MAY PROCEED WITH HIS PLANNED LIPOMA REMOVAL FROM THE ENDOCRINE VIEWPOINT.   Patient is on Aspirin and statin - he cannot tolerate ACEi/ARB as he had angioedema with lisinopril.  Last lipid panel - March 2025 - LDL 58, Trig 61 Last HbA1c - 03/25/2025 - 6.4% Last Vitamin B12 - March 2025 - N Last urine albumin panel - Dec 2023 - neg Last BMP/CMP - Dec 2023 - Cr 1.38 (elevated), 03/26/2024 - Cr 1.44 (0.5 to 1.3), GFR 55, K N, 03/14/2024 - AST N, ALT N, 03/25/2025 - Cr, K, AST, ALT N  CGM INTERPRETATION DONE ON 04/01/2025  Plan: 1. Discontinue metformin  2. CGM to be done via [facility] 7 - patient is connected to the clinic 3. Labs to be done in 3 months - see below 4. Follow up in 3 months to review results and meter.

## 2025-04-01 NOTE — PHYSICAL EXAM
[de-identified] : General: No distress, well nourished Eyes: Normal Sclera, EOMI, PERRL ENT: Normal appearance of the nose, normal oropharynx Neck/Thyroid: No cervical lymphadenopathy, thyroid gland 20 g in size, no thyroid nodules, non-tender Respiratory: No use of accessory muscles of respiration, vesicular breath sounds heard bilaterally, no crepitations or ronchi Cardiovascular: S1 and S2 heard and normal, no S3 or S4, no murmurs, radial pulse normal bilaterally Abdomen: soft, non-tender, no masses, normal bowel sounds Musculoskeletal: No swelling or deformities of joints of hands, no pedal edema Neurological: Normal range of motion in the hands, Normal brachioradialis reflexes bilaterally Psychiatry: Patient converses normally, good judgement and insight Skin: No rashes in hands, no nodules palpated in hands  [de-identified] : I defer DM foot exam to podiatry

## 2025-04-02 NOTE — DISCUSSION/SUMMARY
[FreeTextEntry1] : Mr Bland has no complaints.  His exam shows no change in his weight, regular rhythm, normal blood pressure, clear lungs, normal cardiac exam, and about 1+ peripheral edema.  His EKG shows some minor nonspecific T wave flattening and is otherwise normal.  Is unchanged.  He appears stable and is a good surgical candidate.  He is cleared to proceed as directed.  His Eliquis will be discontinued 3 days prior to the procedure.  He will continue on aspirin, Lipitor, amlodipine, hydrochlorothiazide.  He will follow-up here in 6 months. [EKG obtained to assist in diagnosis and management of assessed problem(s)] : EKG obtained to assist in diagnosis and management of assessed problem(s)

## 2025-04-02 NOTE — HISTORY OF PRESENT ILLNESS
[FreeTextEntry1] : 62-year-old male being seen in preop cardiac evaluation prior to removal of a lipoma from his right leg scheduled for 4/16..  He has no history of structural heart disease..  However he has a history of cerebrovascular disease with strokes in 2023 and again in 2024.  He is also has a history of hypertension, hypercholesterolemia, and diabetes.  In 6/24 he was hospitalized with a GI bleed associated with DVT.  He has been on Eliquis ever since.  He has no current symptoms.  He denies any chest pressure or change in his exercise capacity.  His last LDL cholesterol was 58 on atorvastatin 80.  His last A1c was 6.5 and he reports his metformin has been discontinued.

## 2025-04-17 NOTE — RESULTS/DATA
[FreeTextEntry1] : review of data in EMHR review of HGB levels. negative anticardiolipin antibody, negative beta 2 screen, negative Silica clotting time, negative dilute Sb viper venom

## 2025-04-17 NOTE — HISTORY OF PRESENT ILLNESS
[Disease:__________________________] : Disease: [unfilled] [Date: ____________] : Patient's last distress assessment performed on [unfilled]. [0 - No Distress] : Distress Level: 0 [80: Normal activity with effort; some signs or symptoms of disease.] : 80: Normal activity with effort; some signs or symptoms of disease.  [ECOG Performance Status: 2 - Ambulatory and capable of all self care but unable to carry out any work activities] : Performance Status: 2 - Ambulatory and capable of all self care but unable to carry out any work activities. Up and about more than 50% of waking hours [de-identified] : Johnny Bland is 62-year-old male who is seen in follow up for anemia and deep venous thrombosis. Past medical history is remarkable for hypertension, diabetes, and hyperlipidemia,  history of three CVA first in August 2023; most recent May 2024 ([facility]) ; major neurological defect is balance; he had a cerebellum CVA.  At [facility] and increased bleeding stool hemoccult positive.  given transfusion on 09 June 2024 and he was transferred to [facility]  Seen in hospital 12 June 2024: history of bleeding from gastric ulcer (possible) anemia, diabetes hypertension, Anemia HGB 7.3 g/dL:  [FreeTextEntry1] : aspirin 81 [de-identified] : see HPI

## 2025-04-17 NOTE — PHYSICAL EXAM
[Capable of only limited self care, confined to bed or chair more than 50% of waking hours] : Status 3- Capable of only limited self care, confined to bed or chair more than 50% of waking hours [Normal] : affect appropriate [Ulcers] : no ulcers [Mucositis] : no mucositis [Thrush] : no thrush [Vesicles] : no vesicles [de-identified] : patient needs assistance with standing from wheel chair. [de-identified] : ataxia; normal F to nose. wide based gait

## 2025-04-17 NOTE — ASSESSMENT
[FreeTextEntry1] : Johnny Bland is a 62-year old male with hypertension and diabetes who has atherosclerosis, and this is the leading risk factor for CVA multiple in 2023 and 2024.  NO evidence on testing of a hereditary or acquired predisposition to clotting tendency. Here for follow up post hospitalization [facility] in June 2024. Duplex US Imaging of the right and left legs ordered for 27 August 2024. Follow up in our office is scheduled. Patient will remain on antihypertension medication renewal of the amlodipine apixaban iron and vitamin C We discussed the use of diet and dietary therapy in the management of Crohns Disease. In order to safely and effectively implement the dietary intervention, follow up with our dietician team was recommended.Follow up in 2 months with Justa MENDOZA

## 2025-04-21 NOTE — HISTORY OF PRESENT ILLNESS
[FreeTextEntry1] : The patient prieto 62 year old male here today for a post op visit s/p complex closure of right thigh wound (DOS: 4/16/2025).

## 2025-04-28 NOTE — HISTORY OF PRESENT ILLNESS
[FreeTextEntry1] : The patient is a 62 year old male here today for a post op visit s/p complex closure of right thigh wound (DOS: 4/16/2025). Patient seen in office on Wednesday and had one drain removed. Patient since restarted his eliquis and states since the other drain was removed the remaining drain has gotten "bloody". Patient is concerned and wondering if he should stop taking his eliquis. Patient has no other concerns or complaints at this time. Patient denies fever, chills, or redness

## 2025-04-28 NOTE — PHYSICAL EXAM
[de-identified] : R thigh: Incision healing well with prineo intact. One drain remaining, thin, serosang. Mild swelling of the leg. No signs of infection

## 2025-04-28 NOTE — PHYSICAL EXAM
OUTPATIENT CARDIOLOGY FOLLOW-UP    Name: Iain Rocha    Age: 80 y.o. Primary Care Physician: Jayson Johnson MD    Date of Service: 1/25/2023    Chief Complaint: Dyspnea    Interim History:    Mr. Lynn Anderson is an 80year old gentleman who is followed at our practice by Dr. Ignacia Jones. He has a lengthy past medical history including chronic HFpEF; CAD s/p CABG and redo CABG; hx of VT s/p ICD;  PAF; s/p TAVR. He is being seen today at his request due to dyspnea. He describes progressive dyspnea over the past two to three weeks (such as when walking from room to room in his own) and more recently when speaking long sentences. He denies orthopnea or PND, and his weight has been stable at 173  to 175 lbs on his home scale. He has had no chest pain and hasn't noticed if he's had lower extremity swelling. When he contacted our office last week regarding his symptoms, he was advised to increase Lasix to 40 mg daily (from 40 mg and 20 mg alternating) but has not had increased urination or improvement in his breathing. Review of Systems:   Cardiac: As per HPI  General: No fever, chills, or unusual fatigue   Pulmonary: As per HPI  HEENT: No visual disturbances, headaches, bleeding gums, or epistaxis   GI: No nausea, vomiting, abdominal bloating or early satiety, dark or bloody stools. : No dysuria, hematuria  Endocrine: No temperature intolerance or excessive thirst.   Musculoskeletal: No myalgias or arthralgias   Skin: No rash or ulcerations  Neuro: No syncope. He has vertigo   Psych: Currently with no depression, anxiety    Past Medical History:  MI, 2003. Cardiac catheterization: 85% ostial, proximal and mid LAD narrowings. LMC 70% stenosis. D1 occluded. D2 50% stenosis. OM1 80% ostial stenosis. Mid CX occluded. Dominant RCA severe disease. LVEF 40-45%. CABG, 05/23/2003, [place], [place] with LIMA-LAD, SVG-RI, SVG-OM. Hypertension  Hyperlipidemia. Mild carotid plaque on US, 2003. Echo, 07/2006.  Mild LVE, normal EF, Stage II diastolic dysfunction, moderate AS, mild MR, mild TR. Right leg vein stripping, 1970's. No history diabetes mellitus, stroke or COPD. Nonsmoker for many years. VT causing cardiac arrest after stress test, 03/15/2007. He was successfully cardioverted. Cardiac catheterization, 03/16/2007 with normal LVEF, severe native three vessel coronary disease. SVG-RI, SVG-OM both occluded. LIMA-LAD patent. Re-do CABG, [facility], 03/2007 with radial artery graft to D2 and OM2. Elective PCI with CONNOR native OM2 and native LAD, 03/2007 following CABG. This was done because of inadequate conduits. ICD placement, [facility], 03/2007. ICD lead recall, early 2008, but he was followed electively because his device was functioning normally. Presentation Feng 3, 03/29/2008 with multiple ICD inappropriate shocks. Transfer [facility] where ICD and lead were replaced. He reports cardiac catheterization that revealed patent LIMA-LAD and patent stents in native coronary arteries. Atypical chest pain and anxiety with [facility], [facility]. Troponin minimally elevated. Beta [facility] with lightheadedness, orthostatic hypotension, drop in hemoglobin to 10.5 from 14.9 over two months with an increase in BUN from 16 to 68 over the same time. Dark heme positive stools noted. EKG NSR, incomplete RBBB. Echo, 06/15/2009 with normal LVEF, moderate AS, peak gradient 38 mmHg, BLOSSOM 1.1 cm², moderate MR, LAE. Transtelephonic ICD check, 01/11/2010. No ventricular tachyarrhythmias. Two AF episodes, the longest for 14 hours. Echo, 06/16/2010 with LVE, borderline LVH, normal systolic and diastolic function, normal LA, ICD electrode right heart. Trileaflet AV with moderate to severe AS, mean/peak gradient 20/31 mmHg. BLOSSOM 1.0 cm². MAC with mild MR, normal RVSP. No drug allergies. Family history negative for premature vascular disease.   PAF with DCCV, [facility], [de-identified] : R thigh: Incision healing well with prineo intact. One drain remaining, thin, serosang. Mild swelling of the leg. No signs of infection 12/2010. Hip replacement surgery, 2010 or 2011 [facility] Dr. Bryan Lopez. [facility], 06/05/2012. Moderate sized fixed basal inferior defect, probably artifact. Echo [facility], 10/20/2011. 1+ AR, moderate AS, mild MR, mild TR, normal LVEF. Echo, 01/31/2013. LV not dilated. Mild concentric LVH. Septal paradox. EF 50-55%. BLOSSOM 1.2 cm² consistent with moderate stenosis. Peak/mean gradient 38/21. Stage II diastolic dysfunction. [facility], 08[facility]2013 with dizziness, Hb 7.7, WBC 19.0. CXR negative except cardiomegaly. EKG NSR, leftward axis, RBBB. BMP negative except for elevation in BUN to 55 with Cr 1.3. EGD, 08/28/2013. Large pyloric channel ulcer with clot treated with PRBCs and fresh frozen plasma. Hb 8.7 on day of discharge and stable. Pradaxa was temporarily held. CT abdomen, 09/08/2014. Stable renal cysts with splenic artery aneurysms, which are slightly more prominent than in 08/2013. Mild fatty infiltrate of the liver and stable aneurysm of the infrarenal segment of the abdominal aorta measuring 3.4 cm in maximum diameter. CT chest, 09/17/2014. Consolidation and infiltrate at the left lung base, stable when compared to previous exams with less pleural thickening and calcified plaque in the left pleural cavity without any recent change. Chronic obstructive airways disease. Stable ascending thoracic aneurysm with largest diameter 4.5 cm, unchanged from 08/28/2013. ICD programmed to DDDR mode by Dr. Eladia Menezes, 03/26/2015. Device function normal.  Echo 10/16/2015. EF 39%. Stage II diastolic dysfunction. Aortic stenosis with peak/mean gradients of 48/24 mmHg. Estimated valve area 1.0 cm². ICD generator change for battery depletion, 07/28/2016, Dr. Radha Siu. Eladia Menezes. Echo, 02/03/2017. Normal LV size with mild LVH. Normal regional wall motion and overall systolic function. Diastole could not be assessed, but was presumed to be impaired. The RV was dilated with impaired global systolic function. The LA was enlarged. Mild MAC with mild mitral insufficiency. Moderate tricuspid insufficiency. Aortic valve gradients are peak 47 mmHg with a mean of 27. Dimensionless ratio 0.21. Valve area calculated 0.8 cm². S/P TAVR, 03/29/2017. Echocardiogram, [facility], 4/19/2018, EF 60%. Low normal RV function. Stage 2 diastolic dysfunction. Mild-to-moderate MR. S/P TAVR with a mean gradient of 10 mmHg. RVSP 31 mg.     S/P Cardioversion by Dr. Sujatha Walton, 05/30/2017. Hospital evaluation, 09/07/2018, for 2 appropriate ICD discharges for polymorphic ventricular tachycardia, which occurred after yard work and moving heavy furniture. [facility] 2017 (Tom Andrews): Left main: 0% stenosis LAD: 100 % stenosis Circumflex: 100 % Stenosis AFTER OM1 RCA: Dominant. ANEURYSMAL  DILATATION IN MID PORTION. LONG 75 % stenosis IN MID AND DISTAL VESSEL. LIMA - LAD: PATENT RADIAL - D2: PATENT  RADIAL - OM2: PATENT  10/2020 hospitalized for HFpEF and Afib RVR -- dccv 10/29/2020   TTE (Leon Kayser) 10/30/2020:  Left ventricle is normal in size  Mild asymmetric septal hypertrophy. EF 65%  No regional wall motion abnormalities seen. Normal left ventricular ejection fraction. The left atrium is severely dilated. Mild mitral annular calcification. Mild mitral regurgitation is present. Normally functioning bioprosthetic valve in aortic position. Physiologic and/or trace tricuspid regurgitation. CKD: stage 3b: Baseline creatinine around 1.3.   Chronic anemia   Dysphagia and esophageal spasm s/p esophageal dilatation 12/2022        Past Medical History:   Diagnosis Date    [facility])     follows with Dr Sherine Abad 9/28/22    AAA (abdominal aortic aneurysm)     infrarenal 3.7cm 6/27/22    Arrhythmia     Carotid artery stenosis     CHF (congestive heart failure) ([address]     CKD (chronic kidney disease), stage III ([address]     Heart valve problem     Hyperlipidemia     Hypertension     ICD (implantable cardiac defibrillator) in place 2007    Medtronic Evera XT  HE#[id number] generator changed 7/28/16  Dr Darvin Aguilar    MI (mitral incompetence) 2003    MI (myocardial infarction) [facility]) 2003       Past Surgical History:  Past Surgical History:   Procedure Laterality Date    CARDIAC CATHETERIZATION Right 03/03/2017    Dr. Kimi Ferrer  2007. 2008 2007 78 shocks replaced 2008 Medtronic     CARDIAC DEFIBRILLATOR PLACEMENT  07/28/2016    Medtronic Dual ICD gen change    CARDIAC SURGERY N/A 09/04/2021    cardioversion performed by Keenan Antunez MD at [address]  10/29/2020    Successful    (Dr. Darvin Aguilar)    CARDIOVERSION  05/12/2022    Successful   Dr Darvin Aguilar    COLONOSCOPY N/A 02/24/2020    COLONOSCOPY DIAGNOSTIC performed by Aide Gonsales MD at [address] [facility] N/A 05/04/2022    COLONOSCOPY WITH BIOPSY performed by Jenni Dean MD at [address] [facility] GRAFT  05/23/2003 03/16/2007    DIAGNOSTIC CARDIAC CATH LAB PROCEDURE  2007    DIAGNOSTIC CARDIAC CATH LAB PROCEDURE  03/29/2008    stent    JOINT REPLACEMENT  2011    r hip surgery Dr Rupinder Lees  03/29/2017    Dr. Kristofer Liang and Dr. Huyen Romero- TAVR Josie 26mm valve    UPPER GASTROINTESTINAL ENDOSCOPY N/A 09/03/2019    EGD ESOPHAGOGASTRODUODENOSCOPY performed by Marti Kelsey MD at [address] [place] [address] Dr Jeronimo 02/18/2020    EGD ESOPHAGOGASTRODUODENOSCOPY performed by Aide Gonsales MD at [address] [place] [address] Dr Jeronimo 03/14/2022    EGD CONTROL HEMORRHAGE performed by Jenni Dean MD at [address] [place] [address] Dr Jeronimo 03/16/2022    EGD ESOPHAGOGASTRODUODENOSCOPY performed by Jenni Dean MD at [address] [place] [address] Dr Jeronimo 12/12/2022    EGD ESOPHAGOGASTRODUODENOSCOPY DILATATION performed by Jenni Dean MD at [address] [facility]  1970's       Family History:  History reviewed.  No pertinent family history.     Social History:  Social History     Socioeconomic History    Marital status:      Spouse name: Ananya Brown    Number of children: 2    Years of education: 12     Highest education level: Associate degree: academic program   Occupational History    Not on file   Tobacco Use    Smoking status: Former     Packs/day: 0.50     Years: 3.00     Pack years: 1.50     Types: Cigarettes     Quit date: 1973     Years since quittin.0    Smokeless tobacco: Never    Tobacco comments:     Quit smoking in    Vaping Use    Vaping Use: Never used   Substance and Sexual Activity    Alcohol use: No    Drug use: No    Sexual activity: Not on file   Other Topics Concern    Not on file   Social History Narrative    1 cup coffee daily; occ pop     Social Determinants of Health     Financial Resource Strain: Low Risk     Difficulty of Paying Living Expenses: Not hard at all   Food Insecurity: No Food Insecurity    Worried About Running Out of Food in the Last Year: Never true    Ran Out of Food in the Last Year: Never true   Transportation Needs: Not on file   Physical Activity: Inactive    Days of Exercise per Week: 0 days    Minutes of Exercise per Session: 0 min   Stress: Not on file   Social Connections: Not on file   Intimate Partner Violence: Not on file   Housing Stability: Not on file       Allergies:  No Known Allergies    Current Medications:  Current Outpatient Medications   Medication Sig Dispense Refill    amLODIPine (NORVASC) 2.5 MG tablet TAKE 1 TABLET BY MOUTH  DAILY 90 tablet 3    potassium chloride 20 MEQ/15ML (10%) oral solution Take 7.5 mLs by mouth daily 450 mL 2    ALPRAZolam (XANAX) 0.25 MG tablet Take 0.25 mg by mouth 3 times daily as needed for Sleep.      amiodarone (CORDARONE) 200 MG tablet Take 200 mg by mouth daily Daily beginning 22 per Dr. Sp Madison      meclizine (ANTIVERT) 25 MG tablet Take 1 tablet by mouth daily as needed for Dizziness 90 tablet 0    furosemide (LASIX) 40 MG tablet Take 40 mg by mouth See Admin Instructions Alternates 40 mg and 20 mg      METOPROLOL TARTRATE PO Take by mouth 2 times daily Indications: taking 75 mg in am and 50 mg at night      pravastatin (PRAVACHOL) 40 MG tablet Take 40 mg by mouth at bedtime      rivaroxaban (XARELTO) 20 MG TABS tablet Take 20 mg by mouth Daily with supper       Multiple Vitamin (MULTIVITAMIN ADULT PO) Take 1 tablet by mouth Daily with lunch      pantoprazole (PROTONIX) 40 MG tablet TAKE 1 TABLET BY MOUTH  DAILY (Patient taking differently: Take 40 mg by mouth Daily with lunch) 90 tablet 3    ferrous sulfate (IRON 325) 325 (65 Fe) MG tablet Take 1 tablet by mouth daily (with breakfast) (Patient taking differently: Take 325 mg by mouth Daily with lunch) 90 tablet 1    MAGNESIUM-OXIDE 400 (240 Mg) MG tablet Take 400 mg by mouth Daily with supper      Multiple Vitamins-Minerals (PRESERVISION AREDS 2) CAPS Take 1 capsule by mouth 2 times daily       Cholecalciferol (VITAMIN D) 2000 UNITS CAPS capsule Take 2,000 Units by mouth Daily with lunch       No current facility-administered medications for this visit. Physical Exam:  /60   Pulse 56   Resp 18   Ht 5' 8\" (1.727 m)   Wt 176 lb 11.2 oz (80.2 kg)   BMI 26.87 kg/m²   Wt Readings from Last 3 Encounters:   01/25/23 176 lb 11.2 oz (80.2 kg)   12/12/22 171 lb (77.6 kg)   11/04/22 174 lb (78.9 kg)     Appearance: Awake, alert and oriented x 3, no apparent acute distress  Skin: Intact, no rash  Head: Normocephalic, atraumatic  Eyes: EOMI, no conjunctival erythema  ENMT: No pharyngeal erythema, MMM, no rhinorrhea  Neck: No carotid bruit. Mild JVD  Lungs: Clear to auscultation bilaterally. No wheezes, rales, or rhonchi. Cardiac: Regular rate and rhythm, +S1S2, no murmurs apparent  Abdomen: Soft, nontender, +bowel sounds  Extremities: Moves all extremities x 4, 1+ bilateral lower extremity edema.    Neurologic: No focal motor deficits apparent, normal mood and affect, alert and oriented x 3  Peripheral Pulses: Intact posterior tibial pulses bilaterally      Cardiac Tests:  EKG today showed ventricular pacing     TTE (3/2/2022)   Summary   Normal left ventricular systolic function. Ejection fraction is visually estimated at 60%. Mildly dilated right ventricle with normal right ventricular function. There is doppler evidence of stage II diastolic dysfunction. Moderately dilated left atrium by volume index. Mild mitral regurgitation. History of TAVR (ZANE 3) with 26 mm bioprosthetic valve. No significant paravalvular aortic regurgitation. AV peak velocity 2.1 m/s. AV mean gradient 9 mmHg. AV area 1.5 cm2. Mild tricuspid regurgitation. PASP is estimated at 44 mmHg    Assessment:   Acute on chronic HFpEF  ACC stage C / NYHA class II  CAD status post CABG x3 (LIMA-LAD, SVG-RI, SVG-OM; 2003). Redo CABG in 2007 (radial artery graft to D2 and OM2). Patent grafts in 2017. Chronic RBBB/LAFB  History of VT status post ICD (2007) and lead replacement 2008 and generator change 2016. ICD shocks in 2022 due to polymorphic and monomorphic VT  PAF on chronic anticoagulation (Xarelto)   Chronic amiodarone therapy   Severe AS status post TAVR (3/29/2017)  Hypertension: stable today   CKD: baseline Cr ~ 1.3    HLD : on pravastatin (unable to tolerate Crestor/Lipitor)   Hx of BPH  12.  Anemia: on oral iron supplement       Treatment Plan:   Referral back to CHF clinic : he will be seen in the [place] [address] clinic tomorrow for labs and IV diuresis  Pending lab results, may need to decrease Xarelto dose to 15 mg daily   Consider changing to oral torsemide pending urinary response  Routine device follow up with Dr. Abdias Fernández   Follow up with Dr. Alexys Kaminski in one month (may need close heart failure follow up prior to that)     - Weigh yourself daily     -Stay Hydrated     -Diet should sodium restricted to 2 grams     -Again watch your daily weight trends and if you gain water weight please follow below instructions.     -If you gain 3-5 pounds in 2-3 days OR notice that you are retaining fluid in anyway just like you did before then take an extra dose of your water pill (furosemide/Lasix) every day until you lose the weight or feel better.      -If you notice that you have taken more than 2 extra doses in 1 week then please call and let us know. -If at any time you feel that you are retaining fluid, your medications are not working, or you feel ill in anyway, then please call us for either same day appointment or the next day, and for instructions. Our goal is to keep you out of the emergency room and the hospital and we have ways to do it.      -If you become sick for other reasons, and notice that you are not urinating as much, the urine is very dark, you have significant diarrhea or vomiting, then please DO NOT take your water pill and CALL US immediately. The patient's current medication list, allergies, problem list and results of all previously ordered testing were reviewed at today's visit.     GINETTE Mcdowell-CNS   [facility]) Cardiology

## 2025-05-05 NOTE — ASSESSMENT
[Palliative Care Plan] : not applicable at this time [FreeTextEntry1] : Johnny Bland is a 62-year-old male with hypertension and diabetes who has atherosclerosis, and this is the leading risk factor for CVA multiple in 2023 and 2024.  NO evidence on testing of a hereditary or acquired predisposition to clotting tendency. post hospitalization [facility] in June 2024.; and laboratory results up to 08/2024 were discussed with him as below. Duplex US Imaging of the right and left legs ordered for 27 August 2024. Follow up in our office is scheduled. Patient will remain on antihypertension medication renewal of the amlodipine apixaban iron and vitamin C. On 05/02/2025 he is recovering from lipoma surgery. There has been bleeding on chronic use of aspirin and low dose apixaban 2.56 mg PO BID. Bleeding now controlled and he may take oral iron to make up for any iron and blood loss. bandage on examination is dry and he was recently wrapped before our office visit.  Keep anticoagulation Aspirin 81 mg and apixaban 2.5 mg PO BID for stroke and DVT prevention. RTC in 6 months

## 2025-05-05 NOTE — HISTORY OF PRESENT ILLNESS
[Disease:__________________________] : Disease: [unfilled] [Date: ____________] : Patient's last distress assessment performed on [unfilled]. [0 - No Distress] : Distress Level: 0 [80: Normal activity with effort; some signs or symptoms of disease.] : 80: Normal activity with effort; some signs or symptoms of disease.  [ECOG Performance Status: 2 - Ambulatory and capable of all self care but unable to carry out any work activities] : Performance Status: 2 - Ambulatory and capable of all self care but unable to carry out any work activities. Up and about more than 50% of waking hours [de-identified] : Johnny Bland is 62-year-old male who is seen in follow up for anemia and deep venous thrombosis. Past medical history is remarkable for hypertension, diabetes, and hyperlipidemia,  history of three CVA first in August 2023; most recent May 2024 ([facility]) ; major neurological defect is balance; he had a cerebellum CVA.  At [facility] and increased bleeding stool hemoccult positive.  given transfusion on 09 June 2024 and he was transferred to [facility]  Seen in hospital 12 June 2024: history of bleeding from gastric ulcer (possible) anemia, diabetes hypertension, Anemia HGB 7.3 g/dL:  2025 resection of a left thigh mass: adipose tissue (lipoma) few atypical cells, [FreeTextEntry1] : aspirin 81; apixaban 2.5 mg PO BID [de-identified] : Patient seen with family member today. He has greater ambulation and more articulate in speech. No hospital stay in past two months. No bleeding while he remains ofn aspirin and apixaban 2.5 mg PO BID; right thigh mass compatible with lipoma removed ; some bleeding associated with apixaban.

## 2025-05-05 NOTE — REVIEW OF SYSTEMS
[Diarrhea: Grade 0] : Diarrhea: Grade 0 [Difficulty Walking] : difficulty walking [Negative] : Allergic/Immunologic [Abdominal Pain] : no abdominal pain [Vomiting] : no vomiting [Constipation] : no constipation [FreeTextEntry7] : stomach ulceration [de-identified] : balance difficulty

## 2025-05-05 NOTE — PHYSICAL EXAM
[Capable of only limited self care, confined to bed or chair more than 50% of waking hours] : Status 3- Capable of only limited self care, confined to bed or chair more than 50% of waking hours [Normal] : affect appropriate [Ambulatory and capable of all self care but unable to carry out any work activities] : Status 2- Ambulatory and capable of all self care but unable to carry out any work activities. Up and about more than 50% of waking hours [Ulcers] : no ulcers [Mucositis] : no mucositis [Thrush] : no thrush [Vesicles] : no vesicles [de-identified] : patient needs assistance with standing from wheel chair. [de-identified] : right thigh skin lesion resected in March 2025 with pressure bandage applied.  [de-identified] : ataxia; normal F to nose. wide based gait

## 2025-05-05 NOTE — REVIEW OF SYSTEMS
[Diarrhea: Grade 0] : Diarrhea: Grade 0 [Difficulty Walking] : difficulty walking [Negative] : Allergic/Immunologic [Abdominal Pain] : no abdominal pain [Vomiting] : no vomiting [Constipation] : no constipation [FreeTextEntry7] : stomach ulceration [de-identified] : balance difficulty

## 2025-05-05 NOTE — PHYSICAL EXAM
[Capable of only limited self care, confined to bed or chair more than 50% of waking hours] : Status 3- Capable of only limited self care, confined to bed or chair more than 50% of waking hours [Normal] : affect appropriate [Ambulatory and capable of all self care but unable to carry out any work activities] : Status 2- Ambulatory and capable of all self care but unable to carry out any work activities. Up and about more than 50% of waking hours [Ulcers] : no ulcers [Mucositis] : no mucositis [Thrush] : no thrush [Vesicles] : no vesicles [de-identified] : patient needs assistance with standing from wheel chair. [de-identified] : right thigh skin lesion resected in March 2025 with pressure bandage applied.  [de-identified] : ataxia; normal F to nose. wide based gait

## 2025-05-05 NOTE — REVIEW OF SYSTEMS
[Diarrhea: Grade 0] : Diarrhea: Grade 0 [Difficulty Walking] : difficulty walking [Negative] : Allergic/Immunologic [Abdominal Pain] : no abdominal pain [Vomiting] : no vomiting [Constipation] : no constipation [FreeTextEntry7] : stomach ulceration [de-identified] : balance difficulty

## 2025-05-05 NOTE — HISTORY OF PRESENT ILLNESS
[Disease:__________________________] : Disease: [unfilled] [Date: ____________] : Patient's last distress assessment performed on [unfilled]. [0 - No Distress] : Distress Level: 0 [80: Normal activity with effort; some signs or symptoms of disease.] : 80: Normal activity with effort; some signs or symptoms of disease.  [ECOG Performance Status: 2 - Ambulatory and capable of all self care but unable to carry out any work activities] : Performance Status: 2 - Ambulatory and capable of all self care but unable to carry out any work activities. Up and about more than 50% of waking hours [de-identified] : Johnny Bland is 62-year-old male who is seen in follow up for anemia and deep venous thrombosis. Past medical history is remarkable for hypertension, diabetes, and hyperlipidemia,  history of three CVA first in August 2023; most recent May 2024 ([facility]) ; major neurological defect is balance; he had a cerebellum CVA.  At [facility] and increased bleeding stool hemoccult positive.  given transfusion on 09 June 2024 and he was transferred to [facility]  Seen in hospital 12 June 2024: history of bleeding from gastric ulcer (possible) anemia, diabetes hypertension, Anemia HGB 7.3 g/dL:  2025 resection of a left thigh mass: adipose tissue (lipoma) few atypical cells, [FreeTextEntry1] : aspirin 81; apixaban 2.5 mg PO BID [de-identified] : Patient seen with family member today. He has greater ambulation and more articulate in speech. No hospital stay in past two months. No bleeding while he remains ofn aspirin and apixaban 2.5 mg PO BID; right thigh mass compatible with lipoma removed ; some bleeding associated with apixaban.

## 2025-05-05 NOTE — ASSESSMENT
[Palliative Care Plan] : not applicable at this time [FreeTextEntry1] : Johnny Bland is a 62-year-old male with hypertension and diabetes who has atherosclerosis, and this is the leading risk factor for CVA multiple in 2023 and 2024.  NO evidence on testing of a hereditary or acquired predisposition to clotting tendency. post hospitalization [facility] in June 2024.; and laboratory results up to 08/2024 were discussed with him as below. Duplex US Imaging of the right and left legs ordered for 27 August 2024. Follow up in our office is scheduled. Patient will remain on antihypertension medication renewal of the amlodipine apixaban iron and vitamin C. On 05/02/2025 he is recovering from lipoma surgery. There has been bleeding on chronic use of aspirin and low dose apixaban 2.56 mg PO BID. Bleeding now controlled and he may take oral iron to make up for any iron and blood loss. bandage on examination is dry and he was recently wrapped before our office visit.  Keep anticoagulation Aspirin 81 mg and apixaban 2.5 mg PO BID for stroke and DVT prevention. RTC in 6 months

## 2025-05-05 NOTE — HISTORY OF PRESENT ILLNESS
[Disease:__________________________] : Disease: [unfilled] [Date: ____________] : Patient's last distress assessment performed on [unfilled]. [0 - No Distress] : Distress Level: 0 [80: Normal activity with effort; some signs or symptoms of disease.] : 80: Normal activity with effort; some signs or symptoms of disease.  [ECOG Performance Status: 2 - Ambulatory and capable of all self care but unable to carry out any work activities] : Performance Status: 2 - Ambulatory and capable of all self care but unable to carry out any work activities. Up and about more than 50% of waking hours [de-identified] : Johnny Bland is 62-year-old male who is seen in follow up for anemia and deep venous thrombosis. 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  At [facility] and increased bleeding stool hemoccult positive.  given transfusion on 09 June 2024 and he was transferred to [facility]  Seen in hospital 12 June 2024: history of bleeding from gastric ulcer (possible) anemia, diabetes hypertension, Anemia HGB 7.3 g/dL:  2025 resection of a left thigh mass: adipose tissue (lipoma) few atypical cells, [FreeTextEntry1] : aspirin 81; apixaban 2.5 mg PO BID [de-identified] : Patient seen with family member today. He has greater ambulation and more articulate in speech. No hospital stay in past two months. No bleeding while he remains ofn aspirin and apixaban 2.5 mg PO BID; right thigh mass compatible with lipoma removed ; some bleeding associated with apixaban.

## 2025-05-05 NOTE — RESULTS/DATA
[FreeTextEntry1] : review of data in EMHR review of HGB levels. negative anticardiolipin antibody, negative beta 2 screen, negative Silica clotting time, negative dilute Sb viper venom 03/27/2025 CBC WBC 4.83 HGB 14.8 g/dL  000

## 2025-05-05 NOTE — HISTORY OF PRESENT ILLNESS
[Disease:__________________________] : Disease: [unfilled] [Date: ____________] : Patient's last distress assessment performed on [unfilled]. [0 - No Distress] : Distress Level: 0 [80: Normal activity with effort; some signs or symptoms of disease.] : 80: Normal activity with effort; some signs or symptoms of disease.  [ECOG Performance Status: 2 - Ambulatory and capable of all self care but unable to carry out any work activities] : Performance Status: 2 - Ambulatory and capable of all self care but unable to carry out any work activities. Up and about more than 50% of waking hours [de-identified] : Johnny Bland is 62-year-old male who is seen in follow up for anemia and deep venous thrombosis. 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  At [facility] and increased bleeding stool hemoccult positive.  given transfusion on 09 June 2024 and he was transferred to [facility]  Seen in hospital 12 June 2024: history of bleeding from gastric ulcer (possible) anemia, diabetes hypertension, Anemia HGB 7.3 g/dL:  2025 resection of a left thigh mass: adipose tissue (lipoma) few atypical cells, [FreeTextEntry1] : aspirin 81; apixaban 2.5 mg PO BID [de-identified] : Patient seen with family member today. He has greater ambulation and more articulate in speech. No hospital stay in past two months. No bleeding while he remains ofn aspirin and apixaban 2.5 mg PO BID; right thigh mass compatible with lipoma removed ; some bleeding associated with apixaban.

## 2025-05-05 NOTE — PHYSICAL EXAM
[Capable of only limited self care, confined to bed or chair more than 50% of waking hours] : Status 3- Capable of only limited self care, confined to bed or chair more than 50% of waking hours [Normal] : affect appropriate [Ambulatory and capable of all self care but unable to carry out any work activities] : Status 2- Ambulatory and capable of all self care but unable to carry out any work activities. Up and about more than 50% of waking hours [Ulcers] : no ulcers [Mucositis] : no mucositis [Thrush] : no thrush [Vesicles] : no vesicles [de-identified] : patient needs assistance with standing from wheel chair. [de-identified] : right thigh skin lesion resected in March 2025 with pressure bandage applied.  [de-identified] : ataxia; normal F to nose. wide based gait

## 2025-05-05 NOTE — REVIEW OF SYSTEMS
[Diarrhea: Grade 0] : Diarrhea: Grade 0 [Difficulty Walking] : difficulty walking [Negative] : Allergic/Immunologic [Abdominal Pain] : no abdominal pain [Vomiting] : no vomiting [Constipation] : no constipation [FreeTextEntry7] : stomach ulceration [de-identified] : balance difficulty

## 2025-05-05 NOTE — BEGINNING OF VISIT
[0] : 2) Feeling down, depressed, or hopeless: Not at all (0) [PHQ-9 Negative] : PHQ-9 Negative [Never] : Never [Date Discussed (MM/DD/YY): ___] : Discussed: [unfilled] [With Patient/Caregiver] : with Patient/Caregiver [[id number]] : 0 [Abdominal Pain] : no abdominal pain [Vomiting] : no vomiting [Constipation] : no constipation

## 2025-05-05 NOTE — REVIEW OF SYSTEMS
[Diarrhea: Grade 0] : Diarrhea: Grade 0 [Difficulty Walking] : difficulty walking [Negative] : Allergic/Immunologic [Abdominal Pain] : no abdominal pain [Vomiting] : no vomiting [Constipation] : no constipation [FreeTextEntry7] : stomach ulceration [de-identified] : balance difficulty

## 2025-05-05 NOTE — BEGINNING OF VISIT
[0] : 2) Feeling down, depressed, or hopeless: Not at all (0) [PHQ-9 Negative] : PHQ-9 Negative [Never] : Never [Date Discussed (MM/DD/YY): ___] : Discussed: [unfilled] [With Patient/Caregiver] : with Patient/Caregiver [[id number]] : 0 [Abdominal Pain] : no abdominal pain [Vomiting] : no vomiting [Constipation] : no constipation

## 2025-05-05 NOTE — ASSESSMENT
[Palliative Care Plan] : not applicable at this time [FreeTextEntry1] : Johnny Bland is a 62-year-old male with hypertension and diabetes who has atherosclerosis, and this is the leading risk factor for CVA multiple in 2023 and 2024.  NO evidence on testing of a hereditary or acquired predisposition to clotting tendency. post hospitalization [facility] in June 2024.; and laboratory results up to 08/2024 were discussed with him as below. Duplex US Imaging of the right and left legs ordered for 27 August 2024. Follow up in our office is scheduled. Patient will remain on antihypertension medication renewal of the amlodipine apixaban iron and vitamin C. On 05/02/2025 he is recovering from lipoma surgery. There has been bleeding on chronic use of aspirin and low dose apixaban 2.56 mg PO BID. Bleeding now controlled and he may take oral iron to make up for any iron and blood loss. bandage on examination is dry and he was recently wrapped before our office visit.  Keep anticoagulation Aspirin 81 mg and apixaban 2.5 mg PO BID for stroke and DVT prevention. RTC in 6 months

## 2025-05-05 NOTE — BEGINNING OF VISIT
[0] : 2) Feeling down, depressed, or hopeless: Not at all (0) [PHQ-9 Negative] : PHQ-9 Negative [Never] : Never [Date Discussed (MM/DD/YY): ___] : Discussed: [unfilled] [With Patient/Caregiver] : with Patient/Caregiver [[id number]] : 0 [Abdominal Pain] : no abdominal pain [Vomiting] : no vomiting [Constipation] : no constipation

## 2025-05-05 NOTE — HISTORY OF PRESENT ILLNESS
[Disease:__________________________] : Disease: [unfilled] [Date: ____________] : Patient's last distress assessment performed on [unfilled]. [0 - No Distress] : Distress Level: 0 [80: Normal activity with effort; some signs or symptoms of disease.] : 80: Normal activity with effort; some signs or symptoms of disease.  [ECOG Performance Status: 2 - Ambulatory and capable of all self care but unable to carry out any work activities] : Performance Status: 2 - Ambulatory and capable of all self care but unable to carry out any work activities. Up and about more than 50% of waking hours [de-identified] : Johnny Bland is 62-year-old male who is seen in follow up for anemia and deep venous thrombosis. Past medical history is remarkable for hypertension, diabetes, and hyperlipidemia,  history of three CVA first in August 2023; most recent May 2024 ([facility]) ; major neurological defect is balance; he had a cerebellum CVA.  At [facility] and increased bleeding stool hemoccult positive.  given transfusion on 09 June 2024 and he was transferred to [facility]  Seen in hospital 12 June 2024: history of bleeding from gastric ulcer (possible) anemia, diabetes hypertension, Anemia HGB 7.3 g/dL:  2025 resection of a left thigh mass: adipose tissue (lipoma) few atypical cells, [FreeTextEntry1] : aspirin 81; apixaban 2.5 mg PO BID [de-identified] : Patient seen with family member today. He has greater ambulation and more articulate in speech. No hospital stay in past two months. No bleeding while he remains ofn aspirin and apixaban 2.5 mg PO BID; right thigh mass compatible with lipoma removed ; some bleeding associated with apixaban.

## 2025-05-05 NOTE — ASSESSMENT
[Palliative Care Plan] : not applicable at this time [FreeTextEntry1] : Johnny Bland is a 62-year-old male with hypertension and diabetes who has atherosclerosis, and this is the leading risk factor for CVA multiple in 2023 and 2024.  NO evidence on testing of a hereditary or acquired predisposition to clotting tendency. post hospitalization [facility] in June 2024.; and laboratory results up to 08/2024 were discussed with him as below. Duplex US Imaging of the right and left legs ordered for 27 August 2024. Follow up in our office is scheduled. Patient will remain on antihypertension medication renewal of the amlodipine apixaban iron and vitamin C. On 05/02/2025 he is recovering from lipoma surgery. There has been bleeding on chronic use of aspirin and low dose apixaban 2.56 mg PO BID. Bleeding now controlled and he may take oral iron to make up for any iron and blood loss. bandage on examination is dry and he was recently wrapped before our office visit.  Keep anticoagulation Aspirin 81 mg and apixaban 2.5 mg PO BID for stroke and DVT prevention. RTC in 6 months

## 2025-05-05 NOTE — BEGINNING OF VISIT
[0] : 2) Feeling down, depressed, or hopeless: Not at all (0) [PHQ-9 Negative] : PHQ-9 Negative [Never] : Never [Date Discussed (MM/DD/YY): ___] : Discussed: [unfilled] [With Patient/Caregiver] : with Patient/Caregiver [[id number]] : 0 [Abdominal Pain] : no abdominal pain [Vomiting] : no vomiting [Constipation] : no constipation

## 2025-05-05 NOTE — PHYSICAL EXAM
[Capable of only limited self care, confined to bed or chair more than 50% of waking hours] : Status 3- Capable of only limited self care, confined to bed or chair more than 50% of waking hours [Normal] : affect appropriate [Ambulatory and capable of all self care but unable to carry out any work activities] : Status 2- Ambulatory and capable of all self care but unable to carry out any work activities. Up and about more than 50% of waking hours [Ulcers] : no ulcers [Mucositis] : no mucositis [Thrush] : no thrush [Vesicles] : no vesicles [de-identified] : patient needs assistance with standing from wheel chair. [de-identified] : right thigh skin lesion resected in March 2025 with pressure bandage applied.  [de-identified] : ataxia; normal F to nose. wide based gait

## 2025-05-05 NOTE — BEGINNING OF VISIT
[0] : 2) Feeling down, depressed, or hopeless: Not at all (0) [PHQ-9 Negative] : PHQ-9 Negative [Never] : Never [Date Discussed (MM/DD/YY): ___] : Discussed: [unfilled] [With Patient/Caregiver] : with Patient/Caregiver [[id number]] : 0 [Abdominal Pain] : no abdominal pain [Vomiting] : no vomiting [Constipation] : no constipation

## 2025-05-05 NOTE — PHYSICAL EXAM
[Capable of only limited self care, confined to bed or chair more than 50% of waking hours] : Status 3- Capable of only limited self care, confined to bed or chair more than 50% of waking hours [Normal] : affect appropriate [Ambulatory and capable of all self care but unable to carry out any work activities] : Status 2- Ambulatory and capable of all self care but unable to carry out any work activities. Up and about more than 50% of waking hours [Ulcers] : no ulcers [Mucositis] : no mucositis [Thrush] : no thrush [Vesicles] : no vesicles [de-identified] : patient needs assistance with standing from wheel chair. [de-identified] : right thigh skin lesion resected in March 2025 with pressure bandage applied.  [de-identified] : ataxia; normal F to nose. wide based gait

## 2025-05-07 NOTE — REVIEW OF SYSTEMS
[Negative] : Heme/Lymph [FreeTextEntry5] : see HPI [FreeTextEntry9] : see HPI [de-identified] : see HPI

## 2025-05-07 NOTE — PHYSICAL EXAM
[Normal Appearance] : normal appearance [Respiration, Rhythm And Depth] : normal respiratory rhythm and effort [Heart Sounds] : normal S1 and S2 [Abdomen Soft] : soft [Abdomen Tenderness] : non-tender [Nail Clubbing] : no clubbing of the fingernails [Cyanosis, Localized] : no localized cyanosis [Petechial Hemorrhages (___cm)] : no petechial hemorrhages [] : no ischemic changes [Oriented To Time, Place, And Person] : oriented to person, place, and time [FreeTextEntry1] : see above

## 2025-05-07 NOTE — ASSESSMENT
[FreeTextEntry1] : 60 y/o M PMH CVA w/ RLE weakness, HTN, PAD, DM2, GIB 2/2 gastric ulcer s/p EGD clamping 5/2024, RLE DVT, CKD stage 3, recently hospitalized at [facility] in May to June 2024 for CVA, GIB) discharged to [facility], readmitted due to anemia, s/p PRBC transfusion - noted to have melena and positive guaiac. Patient presents to vascular clinic due to a  right posterior tibial vein DVT(now resoved) and a stable right peroneal vein DVT as well as a persistent DVT within the left gastrocnemius vein.  Patient s/p complex closure of right thigh wound (DOS: 4/16/2025).   Last LE venous duplex 1/2025:  No acute DVT. Chronic occlusive scarring right peroneal vein  Plan: 1. VTE: Continue Eliquis 2.5 mg BID.  Repeat LE venous duplex in 1 month. Appreciate Heme.  Venous duplex with chronic below the knee.  2. Patient with improved mobility  3. Note Eliquis was previously stopped in October.  4. Chronic changes R peroneal vein.  I think with his limited mobility. 5. Appreciate excellent surgical care. 6. Appreciate Heme. 7.  Health Maintenance: Healthy diet. 8. Follow-up in 3-6 months. Call with any questions. Office will call with test results.    I, Dr. Ranjit Ceja, personally performed the evaluation and management (E/M) services for this established patient who presents today.  That E/M includes conducting the examination, assessing all new/exacerbated conditions, and establishing a new plan of care.  Today, my ACP, Frankie Mendez, was here to observe my evaluation and management services for this new problem/exacerbated condition to be followed going forward.

## 2025-05-07 NOTE — REASON FOR VISIT
[Follow-Up - Clinic] : a clinic follow-up of [FreeTextEntry2] : VTE [FreeTextEntry1] : 5/2/2025  Since last visit our pleasant patient reports has biopsy 2 weeks ago. Held Eliquis and then resumed 3 days after. Still has a drain in place to R leg. R legs completely wrapped in ace wrap  1/30/2025  Seen by Dr. Jimenez Oct 2024 Eliquis 2.5m stopped End of Oct Duplex earlier this month with CHRONIC changes in the R BTK Vein  needs biopsy of R  leg lipoma May need surgery for the leg.   LE venous duplex 9/12: No DVT  9/13/2024  Doing well now on Iron BID on eliquis 5mg BID remains on aspirin 81mg daily   LE venous duplex 9/12/2024: No evidence of deep venous thrombosis in either lower extremity.  7/24/2024  Recent hospitalization 6/9 to 6/21/2024  62 y/o M PMH CVA w/ RLE weakness, HTN, PAD, DM2, GIB 2/2 gastric ulcer s/p EGD clamping 5/2024, RLE DVT, CKD stage 3, recently hospitalized at [facility] in May to June 2024 for CVA, GIB) discharged to [facility], readmitted due to anemia, s/p PRBC transfusion - noted to have melena and positive guaiac.  LE venous duplex 7/2/2024: Interval resolution of previously demonstrated right posterior tibial vein DVT. There is stable right peroneal vein DVT. Persistent DVT within the left gastrocnemius vein.  s/p capsule endoscopy; some rare/small/non bleeding angioectasias in the small bowel. Unclear significance. IR was consulted and IVC filter was not recommended.  Patient was discharged on Eliquis 5 mg BID.  Stroke work-up: Negative bubble study Loop recorder implanted, no events so far Hypercoagulable workup was sent: negative so far  Patient does not have a family history of hypercoagulable state, DVT/PE.      Discharge Medications (updated in allscripts): 	acetaminophen 325 mg oral tablet: 2 tab(s) orally every 6 hours as needed amLODIPine 5 mg oral tablet: 1 tab(s) orally once a day apixaban 5 mg oral tablet: 1 tab(s) orally every 12 hours aspirin 81 mg oral delayed release tablet: 1 tab(s) orally once a day atorvastatin 40 mg oral tablet: 1 tab(s) orally once a day (at bedtime) bisacodyl 10 mg rectal suppository: 1 suppository(ies) rectal once a day As needed Constipation hydroCHLOROthiazide 12.5 mg oral capsule: 1 cap(s) orally once a day insulin lispro 100 units/mL subcutaneous solution: subcutaneous 3 times a day (before meals) as per sliding scale. 151-200= 1u, 201-250= 2u, 241-300= 3u, 301-400= 4u MetFORMIN (Eqv-Glucophage XR) 500 mg oral tablet, extended release: 1 tab(s) orally once a day (in the evening) pantoprazole 40 mg oral delayed release tablet: 1 tab(s) orally 2 times a day polyethylene glycol 3350 oral powder for reconstitution: 17 gram(s) orally once a day senna leaf extract oral tablet: 2 tab(s) orally once a day (at bedtime) Vitamin A and D topical ointment: Apply topically to affected area 3 times a day ,

## 2025-06-10 NOTE — PHYSICAL EXAM
[Midline] : trachea located in midline position [Normal] : no rashes [de-identified] : bilat impacted cerumen  [de-identified] : after remopval

## 2025-06-10 NOTE — REASON FOR VISIT
[Initial Consultation] : an initial consultation for [Family Member] : family member [Other: _____] : [unfilled] [FreeTextEntry2] : balance issues

## 2025-06-10 NOTE — ASSESSMENT
[FreeTextEntry1] : Patient with feeling of off balance for several mos.   No blackout or diplopia -  no spinning - hx of CVA x 3 - exam unremarkable after cerumen removed and audio and tymp normal - feel problem central and recommended continue with neurology - may need physical therapy / balance therapy - followup as needed.

## 2025-06-10 NOTE — DATA REVIEWED
[de-identified] :  Type A tymsneha, AU. Testing via inserts:  - 8000Hz, AU. Recs: 1) F/u w/ MD 2) Further tests & recs as per MD

## 2025-06-10 NOTE — HISTORY OF PRESENT ILLNESS
[de-identified] : c/o feeling unsteady - feels like rocking. Problem for about 3-4 mos.  No blackout or diplopia.  Worse during day - somewhat better at night. No problems turning in bed. No prior ear problems.  Hx of CVA x3 over past 2 years.

## 2025-06-10 NOTE — REVIEW OF SYSTEMS
[Dizziness] : dizziness [Negative] : Heme/Lymph [Ear Pain] : no ear pain [Ear Itch] : no ear itch [Hearing Loss] : no hearing loss [Ear Drainage] : no ear drainage [Ear Noises] : no ear noises [Lightheadedness] : no lightheadedness [de-identified] : off balance issues

## 2025-06-18 NOTE — PHYSICAL EXAM
[de-identified] : R thigh: Incision healing very well. Mild swelling of the leg. No signs of infection, breakdown, or erythema

## 2025-06-18 NOTE — PHYSICAL EXAM
[de-identified] : R thigh: Incision healing very well. Mild swelling of the leg. No signs of infection, breakdown, or erythema

## 2025-06-18 NOTE — HISTORY OF PRESENT ILLNESS
[FreeTextEntry1] : The patient is a 62 year old male here today for a post op visit s/p complex closure of right thigh wound (DOS: 4/16/2025). Patient states he is doing well. He states he has noticed some mild swelling near his incision. Patient has no other concerns or complaints at this time. Patient denies fever, chills, or redness

## 2025-06-29 NOTE — ASSESSMENT
[FreeTextEntry1] : Blood work was reveiwed continue all meds  f/u with Cardiology / Neurology  f/u 2 months

## 2025-06-29 NOTE — HISTORY OF PRESENT ILLNESS
[de-identified] : no further dizziness doing PT/OT/speech therapy  will be following up with Neurology next week

## 2025-07-09 NOTE — HISTORY OF PRESENT ILLNESS
[FreeTextEntry1] : The patient is a 62 year old male here today for a post op visit s/p complex closure of right thigh wound (DOS: 4/16/2025). Patient states he is doing well. He states he still has some swelling of his lower extremity and that he has been inconsistent with wearing any type of compression garment. Patient has no other concerns or complaints at this time. Patient denies fever, chills, or redness

## 2025-07-09 NOTE — PHYSICAL EXAM
[de-identified] : R thigh: Incision healing very well. Mild swelling of the leg. No signs of infection, breakdown, or erythema

## 2025-07-15 NOTE — PHYSICAL EXAM
[FreeTextEntry1] : Constitutional: alert, in no acute distress, well nourished and well developed.  Psychiatric: oriented to person, place, and time, the affect was normal and the mood was normal.  Neurologic:   Orientation: oriented to person, oriented to place and oriented to time.   Attention: normal concentrating ability and visual attention was not decreased.   Language: no difficulty naming common objects, no difficulty repeating a phrase, no difficulty writing a sentence, fluency intact, comprehension intact and reading intact.   Fund of knowledge: displays adequate knowledge of personal past history.   Cranial Nerves: extraocular motion intact, facial sensation intact symmetrically, face symmetrical, hearing was intact bilaterally, tongue and palate midline, head turning and shoulder shrug symmetric and there was no tongue deviation with protrusion . right facial UMN, left eye ptosis.   Motor: muscle tone was normal in all four extremities, muscle strength was normal in all four extremities and normal bulk in all four extremities.   Sensory exam: light touch was intact.   Coordination:. the patient's balance was impaired. there was no past-pointing. no tremor present.   Gait: Mildly unsteady with his walker.  Deep tendon reflexes: Biceps right 2+. Biceps left 2+.  Triceps right 2+. Triceps left 2+.  Brachioradialis right 2+. Brachioradialis left 2+.  Patella right 2+. Patella left 2+.  Ankle jerk right 2+. Ankle jerk left 2+.   Plantar responses normal on the right, normal on the left.    Eyes: the sclera and conjunctiva were normal, extraocular movements were intact and full visual field.  Pulmonary: no respiratory distress.  Skin: normal skin color and pigmentation. [General Appearance - Alert] : alert [General Appearance - In No Acute Distress] : in no acute distress [Impaired Insight] : insight and judgment were intact [Affect] : the affect was normal [Sclera] : the sclera and conjunctiva were normal [Extraocular Movements] : extraocular movements were intact [Outer Ear] : the ears and nose were normal in appearance [Examination Of The Oral Cavity] : the lips and gums were normal [Neck Appearance] : the appearance of the neck was normal [Neck Cervical Mass (___cm)] : no neck mass was observed [Auscultation Breath Sounds / Voice Sounds] : lungs were clear to auscultation bilaterally [Heart Sounds] : normal S1 and S2 [Nail Clubbing] : no clubbing  or cyanosis of the fingernails [Motor Tone] : muscle strength and tone were normal [Skin Color & Pigmentation] : normal skin color and pigmentation [Skin Turgor] : normal skin turgor [] : no rash

## 2025-07-15 NOTE — DISCUSSION/SUMMARY
[Antithrombotic therapy with ___] : antithrombotic therapy with  [unfilled] [Intensive Blood Pressure Control] : intensive blood pressure control [Lipid Lowering Therapy] : lipid lowering therapy [Patient encouraged to discuss with Primary MD] : I encouraged the patient to discuss these important issues with ~his/her~ primary care doctor [Goals and Counseling] : I have reviewed the goals of stroke risk factor modification. I counseled the patient on measures to reduce stroke risk, including the importance of medication compliance, risk factor control, exercise, healthy diet and avoidance of smoking. I reviewed stroke warning signs and symptoms and appropriate actions to take if such occur. [FreeTextEntry1] : Mr. 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 The ischemic infarcts were thought to be secondary to basilar artery stenosis which was being monitored over MRA head no, progressive worsening was noted from 35-18. Following this cerebral catheter angiogram was done that showed complete occlusion of basilar artery. He has since recovered significantly but continues to have issues with balance and have recommended that he continue physical therapy along with gym exercises to improve balance.  I also recommended that at this point when we have concrete evidence of basilar artery occlusion it is reasonable to continue antiplatelet/antithrombotic therapy. There is no strong indication for dual antiplatelet therapy however knowing that he had progressive basilar stenosis it is reasonable to continue aspirin 81 mg and ticagrelor 90 mg twice daily along with high-dose statins. I also recommended aggressive medical management of risk factors. And a follow-up MRI brain in 6 months to rule out silent brain infarction due to existing large artery atherosclerotic disease.  4/19/24 - Overall he is neurologically stable. - In February 2024, he had a recurrent stroke with b/l cerebellar infarcts, likely due to symptomatic intracranial atherosclerosis (basilar stenosis versus occlusion). At the time of presentation he was on Aspirin and Clopidogrel; his P2Y12 was subtherapeutic, though he did admit to missing a few doses. He was switched to Brilinta 90mg and Aspirin.  6/25/2024- Phone appointment - Hospital admission - Now on Lovenox for DVT - Hb dropped from 13 to 6.0; transfused, now in [facility] No new infarcts - on MRI in May 2024; Hb is stabilized on 8.5; now on apixaban. Full dose DVT below knee - new - no filter necessary  I had a very lengthy discussion with the patient's wife and the patient and decided to continue aspirin 81 mg in addition to full dose apixaban. They will make an appointment to see us after discharge from [facility] rehab following which we will decide whether he needs antiplatelet and anticoagulation continuously. Based on my review of his case severe vertebrobasilar stenosis, otherwise lower risk of recurrent GI bleed it may be reasonable to maintain him on either dual antiplatelet or full dose anticoagulation for long-term.   3/24/2025 Since last visit he reports feeling well. Denies any new or worsening symptoms. He is currently on AC and ASA as per hematology/vascular for DVT. Once AC is discontinued by them, he will remain on ASA along with strict risk factor control. Signs of stroke discussed in detail. All questions and concerns have been addressed. I will call them after Dopplers.   7/15/25 - Overall he is neurologically stable. -Beginning in June 2025, he has been experiencing increasing episodes of dizziness.  The episodes are not vertigo, as he does not have room spinning sensation.  He denies any other focal neurologic symptoms.  The etiology of his dizziness is unclear, he had a repeat CTA head and neck which was stable from prior exam.  It is possible though that this dizziness is due to vertebrobasilar stenosis versus possibly  orthostatic hypotension.  I have requested a repeat MRI brain to screen for recurrent infarction. -I advised that he follow-up with his cardiologist to address whether or not he has orthostatic hypotension, and possible interventions if so. - We discussed the importance of-maintaining good hydration as well as changing positions slowly, to mitigate fall risk. -He is still on Eliquis for DVT, which will possibly be stopped in about a month.  He is also on aspirin.  In the past, Dr. Ashford questioned whether he should either remain on dual antiplatelet therapy, or perhaps consider full dose anticoagulation, in light of his severe vertebrobasilar stenosis.  I therefore recommend he follow-up with Dr. Ashford at his next availability to further address this matter. - He should continue to benefit from aggressive vascular risk factor control.  In terms of lipids, target LDL should be less than 55. - He will be having Doppler studies later today-we will call him with the results.  He can follow-up with , at his next availability.  I hope he remains free of further serious trouble.

## 2025-07-15 NOTE — HISTORY OF PRESENT ILLNESS
[FreeTextEntry1] : CARLOS FARR is a 63 year-old male with a PMHx significant for HTN, DM II (non-compliant with Metformin), and HLD, who presents to Dr. Fuentes office today for evaluation of Basilar artery stenosis.  Still complains of balance issues - later pulsion - either side nonspecific. No falls  History - HPI modified from  visit - Patient initially presented to [place] ED 8/25/23 complaining of dizziness, nausea/vomiting, and generalized weakness. He reported his symptoms started several days prior that resolved, but returned day of presentation. NIHSS 2. CT Head demonstrated small to moderate subacute Left superior cerebellar infarct and small subacute Left occipital infarct with suspicion for acute Right superior cerebellar infarct as well. CTA Head/Neck was non-diagnostic as there was poor opacification of contrast, but did suggested L superior cerebellar infarct, L occipital infarct, and R cerebellar infarct. Of note, patient's BG in ED was 300s, patient had stopped taking his Metformin. Patient's exam declined and had increased lethargy while in the ED. Patient was outside the window for TNK so was transferred to [facility] Neuro ICU for SOC watch. MRA NOVA demonstrated Vertebrobasilar artery stenosis. Stroke etiology determined ICAD, so patient discharged 9/01/23 with outpatient Neuro follow up. Neurology repeated MRA NOVA which showed even further decreased flow in basilar artery and referred to Neurosurgery for possible angiogram.  Recent labs (12/23): LDL 58 and A1c 6.1%.  TODAY 01/02/2024 Patient reports some intermittent dizziness in the mornings. He is tolerating his Aspirin 162 mg, Brilinta 90 mg BID and Atorvastatin 80 mg without bleeding, bruising or myalgias. No A fib detected on his ILR since implantation. Patient denies headache, cervicalgia, nausea/vomiting, visual disturbance, numbness/tingling, extremity weakness, or seizure-like activity.  4/19/24 He presents to the office today. Unfortunately, he had a recurrent stroke in February 2024. He notes that he has had mild shortness of breath over the last few weeks.  PCP: Dr. Humberto Eason [address], [address], [place], [place] [id number] [phone]  Neuro: Marge Watson NP    3/24/2025 In 6/2024 he was admitted to [facility] with GI bleed and DVTs. He was cleared by GI to start Eliquis as recommended by hematology for DVT. Hematology discontinued AC in 10/2024 but it was restarted by vascular surgery. He is having a lipoma removed from his leg with Dr. Camacho in upcoming weeks. Family notes plan is to continue AC until then and then potential stop it if no longer warranted. He will continue ASA. He was late for scheduled TCD and carotids so they were rescheduled.   7/15/25.  He presents to the office today with his daughter.  In June 2025, he presented to the ED with episodes of dizziness.  He had a CT and CTA which were stable from prior exams.  He notes that he does not experience a room spinning sensation, but does feel intermittent episodes of dizziness.  He is still on Eliquis, which she will be on for at least another month for his DVT.  Other than the dizziness, he denies any new focal neurologic symptoms.

## 2025-07-15 NOTE — CONSULT LETTER
[Dear  ___] : Dear  [unfilled], [Consult Letter:] : I had the pleasure of evaluating your patient, [unfilled]. [Please see my note below.] : Please see my note below. [Sincerely,] : Sincerely, [FreeTextEntry2] : Humberto Eason MD [FreeTextEntry3] : Tayler Roberson, FNP-BC

## 2025-07-29 NOTE — HISTORY OF PRESENT ILLNESS
[FreeTextEntry1] : CARLOS FARR is a 63 year-old male with a PMHx significant for HTN, DM II (non-compliant with Metformin), and HLD, who presents to Dr. Fuentes office today for evaluation of Basilar artery stenosis.  Still complains of balance issues - later pulsion - either side nonspecific. No falls  History - HPI modified from  visit - Patient initially presented to [place] ED 8/25/23 complaining of dizziness, nausea/vomiting, and generalized weakness. He reported his symptoms started several days prior that resolved, but returned day of presentation. NIHSS 2. CT Head demonstrated small to moderate subacute Left superior cerebellar infarct and small subacute Left occipital infarct with suspicion for acute Right superior cerebellar infarct as well. CTA Head/Neck was non-diagnostic as there was poor opacification of contrast, but did suggested L superior cerebellar infarct, L occipital infarct, and R cerebellar infarct. Of note, patient's BG in ED was 300s, patient had stopped taking his Metformin. Patient's exam declined and had increased lethargy while in the ED. Patient was outside the window for TNK so was transferred to [facility] Neuro ICU for SOC watch. MRA NOVA demonstrated Vertebrobasilar artery stenosis. Stroke etiology determined ICAD, so patient discharged 9/01/23 with outpatient Neuro follow up. Neurology repeated MRA NOVA which showed even further decreased flow in basilar artery and referred to Neurosurgery for possible angiogram.  Recent labs (12/23): LDL 58 and A1c 6.1%.  TODAY 01/02/2024 Patient reports some intermittent dizziness in the mornings. He is tolerating his Aspirin 162 mg, Brilinta 90 mg BID and Atorvastatin 80 mg without bleeding, bruising or myalgias. No A fib detected on his ILR since implantation. Patient denies headache, cervicalgia, nausea/vomiting, visual disturbance, numbness/tingling, extremity weakness, or seizure-like activity.  4/19/24 He presents to the office today. Unfortunately, he had a recurrent stroke in February 2024. He notes that he has had mild shortness of breath over the last few weeks.  PCP: Dr. Humberto Eason [address], [address], [place], [place] [id number] [phone]  Neuro: Marge Watson NP    3/24/2025 In 6/2024 he was admitted to [facility] with GI bleed and DVTs. He was cleared by GI to start Eliquis as recommended by hematology for DVT. Hematology discontinued AC in 10/2024 but it was restarted by vascular surgery. He is having a lipoma removed from his leg with Dr. Camacho in upcoming weeks. Family notes plan is to continue AC until then and then potential stop it if no longer warranted. He will continue ASA. He was late for scheduled TCD and carotids so they were rescheduled.   7/15/25.  He presents to the office today with his daughter.  In June 2025, he presented to the ED with episodes of dizziness.  He had a CT and CTA which were stable from prior exams.  He notes that he does not experience a room spinning sensation, but does feel intermittent episodes of dizziness.  He is still on Eliquis, which she will be on for at least another month for his DVT.  Other than the dizziness, he denies any new focal neurologic symptoms.

## 2025-07-29 NOTE — PHYSICAL EXAM
[General Appearance - Alert] : alert [General Appearance - In No Acute Distress] : in no acute distress [Impaired Insight] : insight and judgment were intact [Affect] : the affect was normal [Sclera] : the sclera and conjunctiva were normal [Extraocular Movements] : extraocular movements were intact [Outer Ear] : the ears and nose were normal in appearance [Examination Of The Oral Cavity] : the lips and gums were normal [Neck Appearance] : the appearance of the neck was normal [Neck Cervical Mass (___cm)] : no neck mass was observed [Auscultation Breath Sounds / Voice Sounds] : lungs were clear to auscultation bilaterally [Heart Sounds] : normal S1 and S2 [Nail Clubbing] : no clubbing  or cyanosis of the fingernails [Motor Tone] : muscle strength and tone were normal [Skin Color & Pigmentation] : normal skin color and pigmentation [Skin Turgor] : normal skin turgor [] : no rash [FreeTextEntry1] : Constitutional: alert, in no acute distress, well nourished and well developed.  Psychiatric: oriented to person, place, and time, the affect was normal and the mood was normal.  Neurologic:   Orientation: oriented to person, oriented to place and oriented to time.   Attention: normal concentrating ability and visual attention was not decreased.   Language: no difficulty naming common objects, no difficulty repeating a phrase, no difficulty writing a sentence, fluency intact, comprehension intact and reading intact.   Fund of knowledge: displays adequate knowledge of personal past history.   Cranial Nerves: extraocular motion intact, facial sensation intact symmetrically, face symmetrical, hearing was intact bilaterally, tongue and palate midline, head turning and shoulder shrug symmetric and there was no tongue deviation with protrusion . right facial UMN, left eye ptosis.   Motor: muscle tone was normal in all four extremities, muscle strength was normal in all four extremities and normal bulk in all four extremities.   Sensory exam: light touch was intact.   Coordination:. the patient's balance was impaired. there was no past-pointing. no tremor present.   Gait: Mildly unsteady with his walker.  Deep tendon reflexes: Biceps right 2+. Biceps left 2+.  Triceps right 2+. Triceps left 2+.  Brachioradialis right 2+. Brachioradialis left 2+.  Patella right 2+. Patella left 2+.  Ankle jerk right 2+. Ankle jerk left 2+.   Plantar responses normal on the right, normal on the left.    Eyes: the sclera and conjunctiva were normal, extraocular movements were intact and full visual field.  Pulmonary: no respiratory distress.  Skin: normal skin color and pigmentation.

## 2025-07-29 NOTE — DISCUSSION/SUMMARY
[Antithrombotic therapy with ___] : antithrombotic therapy with  [unfilled] [Intensive Blood Pressure Control] : intensive blood pressure control [Lipid Lowering Therapy] : lipid lowering therapy [Patient encouraged to discuss with Primary MD] : I encouraged the patient to discuss these important issues with ~his/her~ primary care doctor [Goals and Counseling] : I have reviewed the goals of stroke risk factor modification. I counseled the patient on measures to reduce stroke risk, including the importance of medication compliance, risk factor control, exercise, healthy diet and avoidance of smoking. I reviewed stroke warning signs and symptoms and appropriate actions to take if such occur. 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  He was treated at [facility] for dizziness nausea/vomiting-found to have bilateral cerebellar infarctions and left occipital infarction. The ischemic infarcts were thought to be secondary to basilar artery stenosis which was being monitored over MRA head no, progressive worsening was noted from 35-18. Following this cerebral catheter angiogram was done that showed complete occlusion of basilar artery. He has since recovered significantly but continues to have issues with balance and have recommended that he continue physical therapy along with gym exercises to improve balance.  I also recommended that at this point when we have concrete evidence of basilar artery occlusion it is reasonable to continue antiplatelet/antithrombotic therapy. There is no strong indication for dual antiplatelet therapy however knowing that he had progressive basilar stenosis it is reasonable to continue aspirin 81 mg and ticagrelor 90 mg twice daily along with high-dose statins. I also recommended aggressive medical management of risk factors. And a follow-up MRI brain in 6 months to rule out silent brain infarction due to existing large artery atherosclerotic disease.  4/19/24 - Overall he is neurologically stable. - In February 2024, he had a recurrent stroke with b/l cerebellar infarcts, likely due to symptomatic intracranial atherosclerosis (basilar stenosis versus occlusion). At the time of presentation he was on Aspirin and Clopidogrel; his P2Y12 was subtherapeutic, though he did admit to missing a few doses. He was switched to Brilinta 90mg and Aspirin.  6/25/2024- Phone appointment - Hospital admission - Now on Lovenox for DVT - Hb dropped from 13 to 6.0; transfused, now in [facility] No new infarcts - on MRI in May 2024; Hb is stabilized on 8.5; now on apixaban. Full dose DVT below knee - new - no filter necessary  I had a very lengthy discussion with the patient's wife and the patient and decided to continue aspirin 81 mg in addition to full dose apixaban. They will make an appointment to see us after discharge from [facility] rehab following which we will decide whether he needs antiplatelet and anticoagulation continuously. Based on my review of his case severe vertebrobasilar stenosis, otherwise lower risk of recurrent GI bleed it may be reasonable to maintain him on either dual antiplatelet or full dose anticoagulation for long-term.   3/24/2025 Since last visit he reports feeling well. Denies any new or worsening symptoms. He is currently on AC and ASA as per hematology/vascular for DVT. Once AC is discontinued by them, he will remain on ASA along with strict risk factor control. Signs of stroke discussed in detail. All questions and concerns have been addressed. I will call them after Dopplers.   7/15/25 - Overall he is neurologically stable. -Beginning in June 2025, he has been experiencing increasing episodes of dizziness.  The episodes are not vertigo, as he does not have room spinning sensation.  He denies any other focal neurologic symptoms.  The etiology of his dizziness is unclear, he had a repeat CTA head and neck which was stable from prior exam.  It is possible though that this dizziness is due to vertebrobasilar stenosis versus possibly  orthostatic hypotension.  I have requested a repeat MRI brain to screen for recurrent infarction. -I advised that he follow-up with his cardiologist to address whether or not he has orthostatic hypotension, and possible interventions if so. - We discussed the importance of-maintaining good hydration as well as changing positions slowly, to mitigate fall risk. -He is still on Eliquis for DVT, which will possibly be stopped in about a month.  He is also on aspirin.  In the past, Dr. Ashford questioned whether he should either remain on dual antiplatelet therapy, or perhaps consider full dose anticoagulation, in light of his severe vertebrobasilar stenosis.  I therefore recommend he follow-up with Dr. Ashford at his next availability to further address this matter. - He should continue to benefit from aggressive vascular risk factor control.  In terms of lipids, target LDL should be less than 55. - He will be having Doppler studies later today-we will call him with the results.  He can follow-up with , at his next availability.  I hope he remains free of further serious trouble.  7/29/25- following with Vascular Cardiology for DVT - will re-evaluate decision fro anti-c after he has a follow up with them  Dizziness - present but no infarct on MRI - oracio non-cerebrovascular - gave suggestions for hydration  From stroke prevention standpoint - dual antiplatelet if anticoagulation is discontinued.   Reviewed Imaging - CTA HEAD:  severe narrowing of the right V4 segment of the vertebral artery with occlusion distally as it approaches the basilar formation. Left vertebral artery V4 segment is small in caliber and is patent until it forms the basilar artery where it is occluded. Proximal portion of the basilar artery is occluded with reconstitution distally.These findings are similar since 6/9/2024.